# Patient Record
Sex: FEMALE | Race: WHITE | NOT HISPANIC OR LATINO | Employment: FULL TIME | URBAN - NONMETROPOLITAN AREA
[De-identification: names, ages, dates, MRNs, and addresses within clinical notes are randomized per-mention and may not be internally consistent; named-entity substitution may affect disease eponyms.]

---

## 2017-02-15 ENCOUNTER — HISTORY (OUTPATIENT)
Dept: FAMILY MEDICINE | Facility: OTHER | Age: 32
End: 2017-02-15

## 2017-02-15 ENCOUNTER — OFFICE VISIT - GICH (OUTPATIENT)
Dept: FAMILY MEDICINE | Facility: OTHER | Age: 32
End: 2017-02-15

## 2017-02-15 DIAGNOSIS — R68.84 JAW PAIN: ICD-10-CM

## 2017-05-08 ENCOUNTER — COMMUNICATION - GICH (OUTPATIENT)
Dept: FAMILY MEDICINE | Facility: OTHER | Age: 32
End: 2017-05-08

## 2017-05-08 DIAGNOSIS — F41.1 GENERALIZED ANXIETY DISORDER: ICD-10-CM

## 2017-05-09 ENCOUNTER — OFFICE VISIT - GICH (OUTPATIENT)
Dept: FAMILY MEDICINE | Facility: OTHER | Age: 32
End: 2017-05-09

## 2017-05-09 ENCOUNTER — HISTORY (OUTPATIENT)
Dept: FAMILY MEDICINE | Facility: OTHER | Age: 32
End: 2017-05-09

## 2017-05-09 DIAGNOSIS — N60.02 SOLITARY CYST OF LEFT BREAST: ICD-10-CM

## 2017-05-09 DIAGNOSIS — E66.9 OBESITY: ICD-10-CM

## 2017-05-09 DIAGNOSIS — M35.9 SYSTEMIC INVOLVEMENT OF CONNECTIVE TISSUE (H): ICD-10-CM

## 2017-05-09 DIAGNOSIS — Z00.00 ENCOUNTER FOR GENERAL ADULT MEDICAL EXAMINATION WITHOUT ABNORMAL FINDINGS: ICD-10-CM

## 2017-05-09 DIAGNOSIS — N60.01 SOLITARY CYST OF RIGHT BREAST: ICD-10-CM

## 2017-05-09 LAB
GLUCOSE SERPL-MCNC: 129 MG/DL (ref 70–105)
RUBELLA COMMENT - HISTORICAL: NORMAL

## 2017-05-09 ASSESSMENT — ANXIETY QUESTIONNAIRES
1. FEELING NERVOUS, ANXIOUS, OR ON EDGE: SEVERAL DAYS
6. BECOMING EASILY ANNOYED OR IRRITABLE: MORE THAN HALF THE DAYS
7. FEELING AFRAID AS IF SOMETHING AWFUL MIGHT HAPPEN: SEVERAL DAYS
3. WORRYING TOO MUCH ABOUT DIFFERENT THINGS: SEVERAL DAYS
5. BEING SO RESTLESS THAT IT IS HARD TO SIT STILL: NOT AT ALL
GAD7 TOTAL SCORE: 7
4. TROUBLE RELAXING: SEVERAL DAYS
2. NOT BEING ABLE TO STOP OR CONTROL WORRYING: SEVERAL DAYS

## 2017-05-09 ASSESSMENT — PATIENT HEALTH QUESTIONNAIRE - PHQ9: SUM OF ALL RESPONSES TO PHQ QUESTIONS 1-9: 4

## 2017-05-11 ENCOUNTER — COMMUNICATION - GICH (OUTPATIENT)
Dept: FAMILY MEDICINE | Facility: OTHER | Age: 32
End: 2017-05-11

## 2017-05-11 DIAGNOSIS — F41.1 GENERALIZED ANXIETY DISORDER: ICD-10-CM

## 2017-05-15 ENCOUNTER — COMMUNICATION - GICH (OUTPATIENT)
Dept: FAMILY MEDICINE | Facility: OTHER | Age: 32
End: 2017-05-15

## 2017-06-06 ENCOUNTER — AMBULATORY - GICH (OUTPATIENT)
Dept: FAMILY MEDICINE | Facility: OTHER | Age: 32
End: 2017-06-06

## 2017-06-06 ENCOUNTER — COMMUNICATION - GICH (OUTPATIENT)
Dept: FAMILY MEDICINE | Facility: OTHER | Age: 32
End: 2017-06-06

## 2017-06-06 DIAGNOSIS — N60.01 SOLITARY CYST OF RIGHT BREAST: ICD-10-CM

## 2017-06-06 DIAGNOSIS — M79.7 FIBROMYALGIA: ICD-10-CM

## 2017-06-30 ENCOUNTER — AMBULATORY - GICH (OUTPATIENT)
Dept: SCHEDULING | Facility: OTHER | Age: 32
End: 2017-06-30

## 2017-06-30 ENCOUNTER — TRANSFERRED RECORDS (OUTPATIENT)
Dept: HEALTH INFORMATION MANAGEMENT | Facility: OTHER | Age: 32
End: 2017-06-30

## 2017-07-07 ENCOUNTER — COMMUNICATION - GICH (OUTPATIENT)
Dept: FAMILY MEDICINE | Facility: OTHER | Age: 32
End: 2017-07-07

## 2017-07-07 DIAGNOSIS — F41.1 GENERALIZED ANXIETY DISORDER: ICD-10-CM

## 2017-07-12 ENCOUNTER — TRANSFERRED RECORDS (OUTPATIENT)
Dept: HEALTH INFORMATION MANAGEMENT | Facility: OTHER | Age: 32
End: 2017-07-12

## 2017-07-17 ENCOUNTER — AMBULATORY - GICH (OUTPATIENT)
Dept: SCHEDULING | Facility: OTHER | Age: 32
End: 2017-07-17

## 2017-08-21 ENCOUNTER — AMBULATORY - GICH (OUTPATIENT)
Dept: LAB | Facility: OTHER | Age: 32
End: 2017-08-21

## 2017-08-21 DIAGNOSIS — Z79.899 OTHER LONG TERM (CURRENT) DRUG THERAPY: ICD-10-CM

## 2017-08-21 LAB
A/G RATIO - HISTORICAL: 1.3 (ref 1–2)
ABSOLUTE BASOPHILS - HISTORICAL: 0 THOU/CU MM
ABSOLUTE EOSINOPHILS - HISTORICAL: 0.2 THOU/CU MM
ABSOLUTE IMMATURE GRANULOCYTES(METAS,MYELOS,PROS) - HISTORICAL: 0.1 THOU/CU MM
ABSOLUTE LYMPHOCYTES - HISTORICAL: 4.5 THOU/CU MM (ref 0.9–2.9)
ABSOLUTE MONOCYTES - HISTORICAL: 1.5 THOU/CU MM
ABSOLUTE NEUTROPHILS - HISTORICAL: 6.6 THOU/CU MM (ref 1.7–7)
ALBUMIN SERPL-MCNC: 3.9 G/DL (ref 3.5–5.7)
ALP SERPL-CCNC: 60 IU/L (ref 34–104)
ALT (SGPT) - HISTORICAL: 15 IU/L (ref 7–52)
ANION GAP - HISTORICAL: 12 (ref 5–18)
AST SERPL-CCNC: 12 IU/L (ref 13–39)
BASOPHILS # BLD AUTO: 0.2 %
BILIRUB SERPL-MCNC: 0.3 MG/DL (ref 0.3–1)
BUN SERPL-MCNC: 15 MG/DL (ref 7–25)
BUN/CREAT RATIO - HISTORICAL: 20
C-REACTIVE PROTEIN - HISTORICAL: <1 MG/DL
CALCIUM SERPL-MCNC: 9.2 MG/DL (ref 8.6–10.3)
CHLORIDE SERPLBLD-SCNC: 104 MMOL/L (ref 98–107)
CK SERPL-CCNC: 32 IU/L (ref 30–223)
CO2 SERPL-SCNC: 22 MMOL/L (ref 21–31)
CREAT SERPL-MCNC: 0.76 MG/DL (ref 0.7–1.3)
EOSINOPHIL NFR BLD AUTO: 1.8 %
ERYTHROCYTE [DISTWIDTH] IN BLOOD BY AUTOMATED COUNT: 13.2 % (ref 11.5–15.5)
ERYTHROCYTE [SEDIMENTATION RATE] IN BLOOD: 23 MM/HR
GFR IF NOT AFRICAN AMERICAN - HISTORICAL: >60 ML/MIN/1.73M2
GLOBULIN - HISTORICAL: 2.9 G/DL (ref 2–3.7)
GLUCOSE SERPL-MCNC: 84 MG/DL (ref 70–105)
HCT VFR BLD AUTO: 36.5 % (ref 33–51)
HEMOGLOBIN: 11.9 G/DL (ref 12–16)
IMMATURE GRANULOCYTES(METAS,MYELOS,PROS) - HISTORICAL: 0.5 %
LYMPHOCYTES NFR BLD AUTO: 34.6 % (ref 20–44)
MCH RBC QN AUTO: 28.7 PG (ref 26–34)
MCHC RBC AUTO-ENTMCNC: 32.6 G/DL (ref 32–36)
MCV RBC AUTO: 88 FL (ref 80–100)
MONOCYTES NFR BLD AUTO: 11.9 %
NEUTROPHILS NFR BLD AUTO: 51 % (ref 42–72)
PLATELET # BLD AUTO: 434 THOU/CU MM (ref 140–440)
PMV BLD: 9.6 FL (ref 6.5–11)
POTASSIUM SERPL-SCNC: 3.6 MMOL/L (ref 3.5–5.1)
PROT SERPL-MCNC: 6.8 G/DL (ref 6.4–8.9)
RED BLOOD COUNT - HISTORICAL: 4.14 MIL/CU MM (ref 4–5.2)
SODIUM SERPL-SCNC: 138 MMOL/L (ref 133–143)
WHITE BLOOD COUNT - HISTORICAL: 13 THOU/CU MM (ref 4.5–11)

## 2017-08-22 LAB — Lab: 4 U/L

## 2017-10-03 ENCOUNTER — AMBULATORY - GICH (OUTPATIENT)
Dept: LAB | Facility: OTHER | Age: 32
End: 2017-10-03

## 2017-10-03 DIAGNOSIS — Z79.899 OTHER LONG TERM (CURRENT) DRUG THERAPY: ICD-10-CM

## 2017-10-03 LAB
A/G RATIO - HISTORICAL: 1.6 (ref 1–2)
ABSOLUTE BASOPHILS - HISTORICAL: 0 THOU/CU MM
ABSOLUTE EOSINOPHILS - HISTORICAL: 0.2 THOU/CU MM
ABSOLUTE IMMATURE GRANULOCYTES(METAS,MYELOS,PROS) - HISTORICAL: 0.1 THOU/CU MM
ABSOLUTE LYMPHOCYTES - HISTORICAL: 4.9 THOU/CU MM (ref 0.9–2.9)
ABSOLUTE MONOCYTES - HISTORICAL: 1.5 THOU/CU MM
ABSOLUTE NEUTROPHILS - HISTORICAL: 8.2 THOU/CU MM (ref 1.7–7)
ALBUMIN SERPL-MCNC: 4.3 G/DL (ref 3.5–5.7)
ALP SERPL-CCNC: 57 IU/L (ref 34–104)
ALT (SGPT) - HISTORICAL: 13 IU/L (ref 7–52)
ANION GAP - HISTORICAL: 11 (ref 5–18)
AST SERPL-CCNC: 12 IU/L (ref 13–39)
BASOPHILS # BLD AUTO: 0.3 %
BILIRUB SERPL-MCNC: 0.3 MG/DL (ref 0.3–1)
BUN SERPL-MCNC: 14 MG/DL (ref 7–25)
BUN/CREAT RATIO - HISTORICAL: 18
C-REACTIVE PROTEIN - HISTORICAL: <1 MG/DL
CALCIUM SERPL-MCNC: 9.6 MG/DL (ref 8.6–10.3)
CHLORIDE SERPLBLD-SCNC: 105 MMOL/L (ref 98–107)
CK SERPL-CCNC: 47 IU/L (ref 30–223)
CO2 SERPL-SCNC: 22 MMOL/L (ref 21–31)
CREAT SERPL-MCNC: 0.8 MG/DL (ref 0.7–1.3)
EOSINOPHIL NFR BLD AUTO: 1.5 %
ERYTHROCYTE [DISTWIDTH] IN BLOOD BY AUTOMATED COUNT: 13.6 % (ref 11.5–15.5)
ERYTHROCYTE [SEDIMENTATION RATE] IN BLOOD: 17 MM/HR
GFR IF NOT AFRICAN AMERICAN - HISTORICAL: >60 ML/MIN/1.73M2
GLOBULIN - HISTORICAL: 2.7 G/DL (ref 2–3.7)
GLUCOSE SERPL-MCNC: 91 MG/DL (ref 70–105)
HCT VFR BLD AUTO: 37.3 % (ref 33–51)
HEMOGLOBIN: 11.9 G/DL (ref 12–16)
IMMATURE GRANULOCYTES(METAS,MYELOS,PROS) - HISTORICAL: 0.5 %
LYMPHOCYTES NFR BLD AUTO: 33 % (ref 20–44)
MCH RBC QN AUTO: 27.9 PG (ref 26–34)
MCHC RBC AUTO-ENTMCNC: 31.9 G/DL (ref 32–36)
MCV RBC AUTO: 87 FL (ref 80–100)
MONOCYTES NFR BLD AUTO: 10 %
NEUTROPHILS NFR BLD AUTO: 54.7 % (ref 42–72)
PLATELET # BLD AUTO: 473 THOU/CU MM (ref 140–440)
PMV BLD: 10 FL (ref 6.5–11)
POTASSIUM SERPL-SCNC: 3.6 MMOL/L (ref 3.5–5.1)
PROT SERPL-MCNC: 7 G/DL (ref 6.4–8.9)
RED BLOOD COUNT - HISTORICAL: 4.27 MIL/CU MM (ref 4–5.2)
SODIUM SERPL-SCNC: 138 MMOL/L (ref 133–143)
WHITE BLOOD COUNT - HISTORICAL: 15 THOU/CU MM (ref 4.5–11)

## 2017-10-04 LAB — Lab: 4 U/L

## 2017-10-16 ENCOUNTER — TRANSFERRED RECORDS (OUTPATIENT)
Dept: HEALTH INFORMATION MANAGEMENT | Facility: OTHER | Age: 32
End: 2017-10-16

## 2017-10-26 ENCOUNTER — TRANSFERRED RECORDS (OUTPATIENT)
Dept: HEALTH INFORMATION MANAGEMENT | Facility: OTHER | Age: 32
End: 2017-10-26

## 2017-11-14 ENCOUNTER — AMBULATORY - GICH (OUTPATIENT)
Dept: LAB | Facility: OTHER | Age: 32
End: 2017-11-14

## 2017-11-14 DIAGNOSIS — Z79.899 OTHER LONG TERM (CURRENT) DRUG THERAPY: ICD-10-CM

## 2017-11-14 DIAGNOSIS — M33.20 POLYMYOSITIS (H): ICD-10-CM

## 2017-11-14 LAB
A/G RATIO - HISTORICAL: 1.4 (ref 1–2)
ABSOLUTE BASOPHILS - HISTORICAL: 0.1 THOU/CU MM
ABSOLUTE EOSINOPHILS - HISTORICAL: 0.3 THOU/CU MM
ABSOLUTE IMMATURE GRANULOCYTES(METAS,MYELOS,PROS) - HISTORICAL: 0.1 THOU/CU MM
ABSOLUTE LYMPHOCYTES - HISTORICAL: 1.9 THOU/CU MM (ref 0.9–2.9)
ABSOLUTE MONOCYTES - HISTORICAL: 1.2 THOU/CU MM
ABSOLUTE NEUTROPHILS - HISTORICAL: 8.5 THOU/CU MM (ref 1.7–7)
ALBUMIN SERPL-MCNC: 4.2 G/DL (ref 3.5–5.7)
ALP SERPL-CCNC: 68 IU/L (ref 34–104)
ALT (SGPT) - HISTORICAL: 15 IU/L (ref 7–52)
ANION GAP - HISTORICAL: 8 (ref 5–18)
AST SERPL-CCNC: 15 IU/L (ref 13–39)
BASOPHILS # BLD AUTO: 0.4 %
BILIRUB SERPL-MCNC: 0.3 MG/DL (ref 0.3–1)
BUN SERPL-MCNC: 13 MG/DL (ref 7–25)
BUN/CREAT RATIO - HISTORICAL: 19
C-REACTIVE PROTEIN - HISTORICAL: 1.82 MG/DL
CALCIUM SERPL-MCNC: 10 MG/DL (ref 8.6–10.3)
CHLORIDE SERPLBLD-SCNC: 104 MMOL/L (ref 98–107)
CK SERPL-CCNC: 48 IU/L (ref 30–223)
CO2 SERPL-SCNC: 24 MMOL/L (ref 21–31)
CREAT SERPL-MCNC: 0.7 MG/DL (ref 0.7–1.3)
EOSINOPHIL NFR BLD AUTO: 2.7 %
ERYTHROCYTE [DISTWIDTH] IN BLOOD BY AUTOMATED COUNT: 14 % (ref 11.5–15.5)
ERYTHROCYTE [SEDIMENTATION RATE] IN BLOOD: 31 MM/HR
GFR IF NOT AFRICAN AMERICAN - HISTORICAL: >60 ML/MIN/1.73M2
GLOBULIN - HISTORICAL: 3.1 G/DL (ref 2–3.7)
GLUCOSE SERPL-MCNC: 92 MG/DL (ref 70–105)
HCT VFR BLD AUTO: 37.5 % (ref 33–51)
HEMOGLOBIN: 11.9 G/DL (ref 12–16)
IMMATURE GRANULOCYTES(METAS,MYELOS,PROS) - HISTORICAL: 0.6 %
LYMPHOCYTES NFR BLD AUTO: 15.6 % (ref 20–44)
MCH RBC QN AUTO: 27.4 PG (ref 26–34)
MCHC RBC AUTO-ENTMCNC: 31.7 G/DL (ref 32–36)
MCV RBC AUTO: 86 FL (ref 80–100)
MONOCYTES NFR BLD AUTO: 9.6 %
NEUTROPHILS NFR BLD AUTO: 71.1 % (ref 42–72)
PLATELET # BLD AUTO: 475 THOU/CU MM (ref 140–440)
PMV BLD: 9.7 FL (ref 6.5–11)
POTASSIUM SERPL-SCNC: 4.2 MMOL/L (ref 3.5–5.1)
PROT SERPL-MCNC: 7.3 G/DL (ref 6.4–8.9)
RED BLOOD COUNT - HISTORICAL: 4.34 MIL/CU MM (ref 4–5.2)
SODIUM SERPL-SCNC: 136 MMOL/L (ref 133–143)
WHITE BLOOD COUNT - HISTORICAL: 12 THOU/CU MM (ref 4.5–11)

## 2017-11-15 LAB — Lab: 6 U/L

## 2017-12-04 ENCOUNTER — AMBULATORY - GICH (OUTPATIENT)
Dept: SCHEDULING | Facility: OTHER | Age: 32
End: 2017-12-04

## 2017-12-28 NOTE — TELEPHONE ENCOUNTER
Patient Information     Patient Name MRN Maria G Hassan [id number] Female 1985      Telephone Encounter by Naa Hurtado at 7/10/2017 12:00 PM     Author:  Naa Hurtado Service:  (none) Author Type:  NURS- Student Nurse     Filed:  7/10/2017 12:03 PM Encounter Date:  2017 Status:  Signed     :  Naa Hurtado (NURS- Student Nurse)            Refill refused, last filled 17 #90 r-3.     Unable to complete prescription refill per RN Medication Refill Policy.................... Naa Hurtado RN ....................  7/10/2017   12:01 PM

## 2018-01-03 NOTE — PROGRESS NOTES
Patient Information     Patient Name MRN Sex Maria G Styles [id number] Female 1985      Progress Notes by Anyi Sahu NP at 2/15/2017  6:15 PM     Author:  Anyi Sahu NP Service:  (none) Author Type:  PHYS- Nurse Practitioner     Filed:  2017  2:18 PM Encounter Date:  2/15/2017 Status:  Signed     :  Anyi Sahu NP (PHYS- Nurse Practitioner)            HPI:    Maria G Haley is a 31 y.o. female who presents to clinic today for neck/ear/jaw pain.   Right sided jaw and neck pain for the past 6 days.  Today with right sided ear pain.  Chronic sore throat.  Headache today - feels like a muscle tension.  Denies fevers.  no difficulty swallowing.  Painful to chew.  Mild popping sensation in right ear.  No ringing in ears.  Not taking any muscle relaxers.  Trying not to take any Ibuprofen or Tylenol.  Has not tried any ice or heat.  States she is being worked up for polymyositis - states she is seeing a specialist next month.    She has been going to [facility].            Past Medical History      Diagnosis   Date     Gastroesophageal reflux disease       Kidney stone   2008     R kidney stone ct      Knee pain, bilateral       Learning disability       /depression ?ADHD      Mild recurrent major depression (HC)       NICOTINE ADDICTION  2011     Using nicoderm patch        Tetanus-diphtheria (Td) vaccination       Past Surgical History       Procedure   Laterality Date     Tympanostomy   age 5     PC tubes        Tonsil and adenoidectomy   age 5     Social History       Substance Use Topics         Smoking status:   Former Smoker     Packs/day:  0.50     Types:  Cigarettes     Quit date:  2013     Smokeless tobacco:   Never Used      Comment: information provided on NRT      Alcohol use   No     Current Outpatient Prescriptions       Medication  Sig Dispense Refill     ALPRAZolam (XANAX) 0.25 mg tablet Take 1 tablet by mouth 3 times daily if  "needed. 15 tablet 0     citalopram (CELEXA) 10 mg tablet TAKE ONE TABLET BY MOUTH EVERY DAY 90 tablet 1     cyclobenzaprine (FLEXERIL) 10 mg tablet Take 10 mg by mouth.       ibuprofen (ADVIL; MOTRIN) 800 mg tablet Take 800 mg by mouth every 8 hours if needed.       triamcinolone (ARISTOCORT; KENALOG) 0.1 % cream Apply  topically to affected area(s) 3 times daily. 1 Tube 0     No current facility-administered medications for this visit.      Medications have been reviewed by me and are current to the best of my knowledge and ability.    Allergies      Allergen   Reactions     Macrobid [Nitrofurantoin]  *Unknown     Vicodin [Hydrocodone-Acetaminophen]  Nausea Only     Zoloft [Sertraline]  Anxiety     Increased patient's anxiety        ROS:  Refer to HPI    Visit Vitals       /80     Pulse 88     Temp 98.1  F (36.7  C) (Tympanic)     Ht 1.535 m (5' 0.43\")     Wt 79.6 kg (175 lb 6.4 oz)     Breastfeeding No     BMI 33.77 kg/m2       EXAM:  General Appearance: Well appearing adult female, appropriate appearance for age. No acute distress  Head: normocephalic, atraumatic  Ears: Left TM with bony landmarks appreciated, no erythema, no effusion, no bulging, no purulence.  Right TM with bony landmarks appreciated, no erythema, no effusion, no bulging, no purulence.   Left auditory canal clear.  Right auditory canal clear.  Normal external ears, non tender.  Eyes: conjunctivae normal, no drainage  Orophayrnx: moist mucous membranes, posterior pharynx without erythema, tonsils without hypertrophy, no erythema, no exudates or petechiae, no post nasal drip seen.  No trismus.  Dentition intact, no surrounding erythema or swelling, non tender to palpation.  No sinus pain upon palpation of the frontal, maxillary, or ethmoid sinuses  Neck: supple without adenopathy, no submandibular or parotid swelling or tenderness to palpation   Respiratory: normal chest wall and respirations.  Normal effort.  Clear to auscultation " bilaterally, no wheezes or rhonchi or congestion, no cough appreciated  Cardiac: RRR with no murmurs  Musculoskeletal:  Tenderness to palpation over right TMJ with mild clicking palpable with jaw movement.  No cervical spine tenderness to palpation.  Normal ROM of neck without difficulty.   Dermatological: no rashes or lesions  Psychological: normal affect, alert and pleasant    ASSESSMENT/PLAN:    ICD-10-CM    1. Jaw pain R68.84     right side over TMJ           Likely TMJ disorder  Ibuprofen TID PRN  ICE and Heat PRN  Gentle massage over TMJ  Follow up with PCP if symptoms persist or worsen or concerns        Patient Instructions   Recommend follow up with primary provider or clinic provider for further evaluation    Ibuprofen    ICE and Heat         Index Urdu   Temporomandibular Joint (TMJ) Disorder   ________________________________________________________________________  KEY POINTS    Temporomandibular joint (TMJ) disorder is a condition that causes pain in your jaw.    It may help to eat soft food and learn ways to relax the muscles in your jaw.    TMJ can be treated with moist heat, massage, a mouth splint or guard, and sometimes medicine that can relax the muscles of the jaw.  ________________________________________________________________________  What is temporomandibular joint disorder?  Temporomandibular joint disorder (TMJ disorder) is pain in the joint where your jaw meets your skull, just in front of your ears. TMJ disorder is more common in women than men.  What is the cause?  The cause of TMJ disorder is not known. Possible causes include:    Clenching your jaw or grinding your teeth. You may clench your jaws or grind your teeth when you are feeling stressed or when you are sleeping. If you do it mainly when you are sleeping, you may not even know you are doing it.    Dentures that do not fit properly    Frequent chewing of gum or ice    Physical or dental problems, such as teeth that don t  fit together properly or problems with the shape of your jaw    Swelling and irritation caused by arthritis or injury    Missing teeth that have not been replaced  What are the symptoms?  The most common symptom is pain in your jaw joint, usually on one side only. The pain is usually dull but sometimes it may be sharp. In most cases, the pain is worse when you move your jaw, especially when you are chewing. If you grind your teeth at night, the pain may be worse when you wake up in the morning.  Other symptoms may include:    Clicking, popping, or grating sounds when you move your jaw    Trouble completely opening your jaw or pain when you bite down    Headache    Ear pain or earache  The symptoms of TMJ disorder are similar to the symptoms of other conditions, such as ear problems. You should see your healthcare provider to find out what is causing your symptoms.  How is it diagnosed?  Your healthcare provider will ask about your symptoms, activities, and medical history and examine you. You may have an X-ray.  How is it treated?  If there is a problem with the way your teeth fit together when you bite, your healthcare provider may refer you to a dentist. Your dentist may make a hard splint for you to wear during the day to keep your jaw from closing completely.  Your healthcare provider may give you a shot of steroid medicine to lessen any swelling and irritation caused by arthritis or an injury.  Other treatments may include:    Taking muscle relaxants for a few days    Practicing relaxation techniques    Learning ways to manage stress  Your healthcare provider may refer you to a physical therapist for treatment, such as massage and exercises that gently stretch the muscles and help with relaxation. If your pain is related to stress, counseling and medicine can help.  Surgery is rarely needed.  How can I take care of myself?  To help relieve your symptoms:    Avoid overusing your jaw. You can rest your jaw by  eating only soft food. Do not chew gum or ice.    Try not to clench your jaw or grind your teeth. Your healthcare provider may recommend a bite block (also called a ). A bite block is a plastic mouthpiece that stops your teeth from grinding together. It s usually worn only at night.    Put a warm, moist washcloth on your jaw for 20 minutes, 4 to 8 times a day. Do not use a dry heating pad.    Massage the joint by pressing gently with your fingertips and moving them in a circular direction.    Ask your healthcare provider about taking a nonprescription pain medicine.  Follow the full course of treatment prescribed by your provider. Ask your provider:    How and when you will get your test results    How long it will take to recover    If there are activities you should avoid and when you can return to your normal activities    How to take care of yourself at home    What symptoms or problems you should watch for and what to do if you have them  Make sure you know when you should come back for a checkup. Keep all appointments for provider visits or tests.  How can I help prevent TMJ disorder?   Because the cause of TMJ disorder is often not known, it can be hard to prevent. The following tips may help:    Don t chew a lot of gum or ice.    Try not to grind your teeth.    See your dentist for treatment if your teeth do not fit together well when you chew.    Replace any missing teeth.    Wear proper protective head gear that fits well in all contact sports.  You can get more information from:    American Dental Association  139.854.2234   http://www.mouthhealthy.org/en/  Developed by [facility].  Adult Advisor 2016.3 published by [employer].  Last modified: 2016-03-23  Last reviewed: 2016-02-12  This content is reviewed periodically and is subject to change as new health information becomes available. The information is intended to inform and educate and is not a replacement for medical evaluation, advice,  diagnosis or treatment by a healthcare professional.  References   Adult Advisor 2016.3 Index    Copyright   2016 [employer], a division of [employer] All rights reserved.

## 2018-01-03 NOTE — NURSING NOTE
Patient Information     Patient Name MRN Sex Maria G Styles [id number] Female 1985      Nursing Note by Niru Frederick at 2/15/2017  6:15 PM     Author:  Niru Frederick Service:  (none) Author Type:  NURS- Student Practical Nurse     Filed:  2/15/2017  6:19 PM Encounter Date:  2/15/2017 Status:  Signed     :  Niru Frederick (NURS- Student Practical Nurse)            For past few days patient has had jaw pain right, now it is into ear and neck. Patient states starting today it's in both. Niru Frederick LPN .............2/15/2017  6:11 PM

## 2018-01-03 NOTE — PATIENT INSTRUCTIONS
Patient Information     Patient Name MRN Maria G Hassan [id number] Female 1985      Patient Instructions by Anyi Sahu NP at 2/15/2017  6:29 PM     Author:  Anyi Sahu NP  Service:  (none) Author Type:  PHYS- Nurse Practitioner     Filed:  2/15/2017  6:29 PM  Encounter Date:  2/15/2017 Status:  Addendum     :  Anyi Sahu NP (PHYS- Nurse Practitioner)        Related Notes: Original Note by Anyi Sahu NP (PHYS- Nurse Practitioner) filed at 2/15/2017  6:29 PM            Recommend follow up with primary provider or clinic provider for further evaluation    Ibuprofen    ICE and Heat         Index [place] citizen   Temporomandibular Joint (TMJ) Disorder   ________________________________________________________________________  KEY POINTS    Temporomandibular joint (TMJ) disorder is a condition that causes pain in your jaw.    It may help to eat soft food and learn ways to relax the muscles in your jaw.    TMJ can be treated with moist heat, massage, a mouth splint or guard, and sometimes medicine that can relax the muscles of the jaw.  ________________________________________________________________________  What is temporomandibular joint disorder?  Temporomandibular joint disorder (TMJ disorder) is pain in the joint where your jaw meets your skull, just in front of your ears. TMJ disorder is more common in women than men.  What is the cause?  The cause of TMJ disorder is not known. Possible causes include:    Clenching your jaw or grinding your teeth. You may clench your jaws or grind your teeth when you are feeling stressed or when you are sleeping. If you do it mainly when you are sleeping, you may not even know you are doing it.    Dentures that do not fit properly    Frequent chewing of gum or ice    Physical or dental problems, such as teeth that don t fit together properly or problems with the shape of your jaw    Swelling and irritation caused by arthritis or  injury    Missing teeth that have not been replaced  What are the symptoms?  The most common symptom is pain in your jaw joint, usually on one side only. The pain is usually dull but sometimes it may be sharp. In most cases, the pain is worse when you move your jaw, especially when you are chewing. If you grind your teeth at night, the pain may be worse when you wake up in the morning.  Other symptoms may include:    Clicking, popping, or grating sounds when you move your jaw    Trouble completely opening your jaw or pain when you bite down    Headache    Ear pain or earache  The symptoms of TMJ disorder are similar to the symptoms of other conditions, such as ear problems. You should see your healthcare provider to find out what is causing your symptoms.  How is it diagnosed?  Your healthcare provider will ask about your symptoms, activities, and medical history and examine you. You may have an X-ray.  How is it treated?  If there is a problem with the way your teeth fit together when you bite, your healthcare provider may refer you to a dentist. Your dentist may make a hard splint for you to wear during the day to keep your jaw from closing completely.  Your healthcare provider may give you a shot of steroid medicine to lessen any swelling and irritation caused by arthritis or an injury.  Other treatments may include:    Taking muscle relaxants for a few days    Practicing relaxation techniques    Learning ways to manage stress  Your healthcare provider may refer you to a physical therapist for treatment, such as massage and exercises that gently stretch the muscles and help with relaxation. If your pain is related to stress, counseling and medicine can help.  Surgery is rarely needed.  How can I take care of myself?  To help relieve your symptoms:    Avoid overusing your jaw. You can rest your jaw by eating only soft food. Do not chew gum or ice.    Try not to clench your jaw or grind your teeth. Your healthcare  provider may recommend a bite block (also called a ). A bite block is a plastic mouthpiece that stops your teeth from grinding together. It s usually worn only at night.    Put a warm, moist washcloth on your jaw for 20 minutes, 4 to 8 times a day. Do not use a dry heating pad.    Massage the joint by pressing gently with your fingertips and moving them in a circular direction.    Ask your healthcare provider about taking a nonprescription pain medicine.  Follow the full course of treatment prescribed by your provider. Ask your provider:    How and when you will get your test results    How long it will take to recover    If there are activities you should avoid and when you can return to your normal activities    How to take care of yourself at home    What symptoms or problems you should watch for and what to do if you have them  Make sure you know when you should come back for a checkup. Keep all appointments for provider visits or tests.  How can I help prevent TMJ disorder?   Because the cause of TMJ disorder is often not known, it can be hard to prevent. The following tips may help:    Don t chew a lot of gum or ice.    Try not to grind your teeth.    See your dentist for treatment if your teeth do not fit together well when you chew.    Replace any missing teeth.    Wear proper protective head gear that fits well in all contact sports.  You can get more information from:    American Dental Association  935.274.3260   http://www.mouthhealthy.org/en/  Developed by [facility].  Adult Advisor 2016.3 published by [employer].  Last modified: 2016-03-23  Last reviewed: 2016-02-12  This content is reviewed periodically and is subject to change as new health information becomes available. The information is intended to inform and educate and is not a replacement for medical evaluation, advice, diagnosis or treatment by a healthcare professional.  References   Adult Advisor 2016.3 Index    Copyright   2016  [employer], a division of [employer] All rights reserved.

## 2018-01-04 NOTE — NURSING NOTE
Patient Information     Patient Name MRN Maria G Hassan [id number] Female 1985      Nursing Note by Mi Ireland at 2017  3:00 PM     Author:  Mi Ireland Service:  (none) Author Type:  (none)     Filed:  2017  3:38 PM Encounter Date:  2017 Status:  Signed     :  Mi Ireland            Patient presents to the clinic for physical.  Mi Ireland LPN........................2017  3:00 PM

## 2018-01-04 NOTE — PATIENT INSTRUCTIONS
"Patient Information     Patient Name MRN Sex Maria G Styles [id number] Female 1985      Patient Instructions by Neelam Schwartz MD at 2017  3:00 PM     Author:  Neelam Schwartz MD Service:  (none) Author Type:  Physician     Filed:  2017  4:14 PM Encounter Date:  2017 Status:  Signed     :  Neelam Schwartz MD (Physician)            Car Safety tips:   Wear your seatbelt at all times.   Do not drive when tired.  If drinking alcohol, find a designated .  Do NOT EVER text and drive!   Consider DRIVE MODE mehnaz for your phone.     Referral to rheumatology provided  Weight Loss Strategies  1. Eat at least 3 meals a day and never skip breakfast.  2. Eat more slowly.  3. Decrease portion size.  4. Provide structure by using meal replacement bars or shakes, and/or low calorie frozen meals.  5. For good nutrition incorporate fruit, vegetables, whole grains, lean protein, and low-fat dairy.  6. Remove trigger foods from your environment to avoid impulse eating.  7. Increase physical activity: get a pedometer and aim for 10,000 steps a day or 30-35 minutes of activity 5 days per week.  8. Weigh yourself daily or at least weekly.  9. Keep a record of what you eat and your activity.  10. Establish a support system such as a friend, group or program.    11.  Read Adonis Walker's \"Eat to Live\"   12.   Remember it is important to have a minimum of 1400 calories a day, okay to use olive oil, 40 grams of fiber daily.  No more than two servings ( the size of your palm) of red meat a week.       Labs will be mailed to your home.   Work on following  a mediterranean diet; Use monosaturated fats ( olive , canola and peanut   oil; nuts, avocados), abundance of plant foods which are minimally processed, fish (salmon).  Exercise 30 minutes every day  [facility] staff will call you with your bilateral breast ultrasound appointment.   Try to get 7-8 hours sleep each night.  Rest is " important!      Recommendations for females ages 18-40 years old:    Maintaining a few health-related habits can have a huge impact on your future health. Please consider the following general health recommendations:     Eat a quality diet (generally, low in simple sugars, starches, cholesterol and saturated fat.)    If your diet contains less than 4 servings of dairy products each day, take a Calcium 600mg/Vit D 200IU tablet twice daily to help maintain your bone strength.    If you are considering pregnancy begin a supplemental vitamin with 1 mg of folic acid daily.  Avoid alcohol when you think you might have conceived, as disability related to the fetal alcohol syndrome can occur with as few as 1 to 2 drinks if consumed at a critical time in your baby's development.    Never smoke. Avoid chewing tobacco.    Limit alcohol to no more than 1-2 in 24 hours, as higher use increases your weight, can damage your liver or pancreas, and increases triglycerides (fat in the blood). Never drink and drive.    Avoid the use of recreational drugs.  Methamphetamine, for example, often causes addiction with the first use.    Exercise regularly.  Ideally you would have 30-60 minutes of aerobic exercise at least 4 times weekly.  Find something you enjoy and a friend to do it with you.    Maintain ideal weight.  Body mass index is 33.54 kg/(m^2).  Generally a BMI of 20-25 is considered ideal. Overweight is defined as 25-30, Obese is 30-35 and markedly obese is greater than 35.      Apply sun block (SPF 25 or greater) on exposed skin anytime you are out in the sun to prevent skin cancer.      Wear a seatbelt whenever you are in a car.    You should have a tetanus booster at least every 10 years.  Consider a flu shot every fall.    Immunization History     Administered  Date(s) Administered     Tdap, Unspecified 11/29/2007       Your cholesterol should be checked annually if high, and once every 5 years if normal.    You should have  a pap  every 2 years between ages 21-30 and every 3 years between the ages of 30 and 70 unless you have had previous abnormal pap smear, (in these cases the exams and PAP's should be done yearly). If you have had hysterectomy in the past, your future Pap plan may be different.     Consider testing for sexually transmitted disease (STD) if you have had more than 2 partners in the last 5 years or have any other reason to believe you are at risk of infection.  The best way to minimize your risk of STD is to limit the number of partners you have and know their sexual history. Using condoms decreases your risk, but does not always prevent STD.

## 2018-01-04 NOTE — TELEPHONE ENCOUNTER
Patient Information     Patient Name MRN Maria G Hassan [id number] Female 1985      Telephone Encounter by Ann Marie Turner RN at 2017 10:50 AM     Author:  Ann Marie Turner RN Service:  (none) Author Type:  (none)     Filed:  2017 10:53 AM Encounter Date:  2017 Status:  Signed     :  Ann Marie Turner RN (NURS- Registered Nurse)            Depression-in adults 18 and over  SSRI    Office visit in the past 12 months or as indicated in chart.  Should have clinic visit 1-2 months after initial prescription.    Last visit with FERNY HAWKINS was on: 2016 in [facility]  Next visit with FERNY HAWKINS is on: 2017 in [facility]  Next visit with Family Practice is on: 2017 in [facility]    Max refills 12 months from last office visit or per providers notes.      PHQ Depression Screening 11/10/2015 2016   Date of PHQ exam (doc flow) 11/10/2015 2016   1. Lack of interest/pleasure 0 - Not at all 0 - Not at all   2. Feeling down/depressed 0 - Not at all 0 - Not at all   PHQ-2 TOTAL SCORE 0 0   3. Trouble sleeping 1 - Several days 1 - Several days   4. Decreased energy 1 - Several days 2 - More than half the days   5. Appetite change 1 - Several days 2 - More than half the days   6. Feelings of failure 1 - Several days 0 - Not at all   7. Trouble concentrating 0 - Not at all 1 - Several days   8. Activity level 0 - Not at all 0 - Not at all   9. Hurting yourself 0 - Not at all 0 - Not at all   PHQ-9 TOTAL SCORE 4 6   PHQ-9 Severity Level none mild   Functional Impairment somewhat difficult somewhat difficult     Patient has appointment tomorrow. Will address medication at that visit.    Unable to complete prescription refill per RN Medication Refill Policy.................... Ann Marie Turner ....................  2017   10:53 AM

## 2018-01-05 NOTE — TELEPHONE ENCOUNTER
Patient Information     Patient Name MRN Maria G Hassan [id number] Female 1985      Telephone Encounter by Sue Joe at 2017  3:40 PM     Author:  Sue Joe Service:  (none) Author Type:  (none)     Filed:  2017  3:40 PM Encounter Date:  5/15/2017 Status:  Signed     :  Sue Joe            Patient notified  Sue Joe LPN     2017 3:40 PM

## 2018-01-05 NOTE — TELEPHONE ENCOUNTER
Patient Information     Patient Name MRN Sex Maria G Styles [id number] Female 1985      Telephone Encounter by Neelam Schwartz MD at 2017  3:28 PM     Author:  Neelam Schwartz MD Service:  (none) Author Type:  Physician     Filed:  2017  3:29 PM Encounter Date:  5/15/2017 Status:  Signed     :  Neelam Schwartz MD (Physician)            It is what was recommended by the radiologist as we discussed at the time of her office visit.

## 2018-01-05 NOTE — TELEPHONE ENCOUNTER
Patient Information     Patient Name MRN Maria G Hassan [id number] Female 1985      Telephone Encounter by Sue Joe at 2017  2:02 PM     Author:  Sue Joe Service:  (none) Author Type:  (none)     Filed:  2017  2:07 PM Encounter Date:  5/15/2017 Status:  Signed     :  Sue Joe            Patient is wondering why US was done verses a mammogram. Sue Joe LPN .......................2017  2:07 PM

## 2018-01-05 NOTE — PROGRESS NOTES
Patient Information     Patient Name MRN Sex     Maria G Haley [id number] Female 1985      Progress Notes by Neelam Schwartz MD at 2017  3:00 PM     Author:  Neelam Schwartz MD Service:  (none) Author Type:  Physician     Filed:  5/10/2017  6:30 PM Encounter Date:  2017 Status:  Signed     :  Neelam Schwartz MD (Physician)            Nursing Notes:   Mi Ireland  2017  3:38 PM  Signed  Patient presents to the clinic for physical.  Mi Ireland LPN........................2017  3:00 PM        SUBJECTIVE:    Maria G Haley is a 32 y.o. female who presents for annual physical examination.     HPI: Patient is a  2 Para2 Here for annual GYN examination Patient's last menstrual period was 2017..  Periods are normal.    She is in a monogamous relationship.  Declines sexually transmitted disease testing.  No vaginal complaints.  No breast complaints.   No family history of breast or ovarian cancer.       RIGHT BREAST ULTRASOUND, 2014     CLINICAL: Abnormal breast finding.     FINDINGS: Scanning in the RIGHT breast upper outer quadrant demonstrates a septated anechoic cyst that does have posterior acoustic enhancement and measures 5 mm. Scanning at the 11 to 12 o'clock position of the RIGHT breast demonstrates two minimally lobulated medium echogenicity structures that are well defined and do have areas of marginal increased echogenicity that are localized. The 11 o'clock abnormality measures approximately 5 x 10 mm and the 12 o'clock abnormality approximately 7 x 13 mm. On color Doppler there is blood flow adjacent to these structures that extend at one point into the margin of the abnormalities.     IMPRESSION: Comparison is made to the prior exam completed 2/3/2014. The medium echogenicity lobulated structures are unchanged in size and appearance. The findings continue to be best explained by intramammary lymph nodes. A followup RIGHT  "breast ultrasound in 6 months is recommended to document continued stability.     Category 3: Probably benign finding. Short term followup is recommended.           Armaan Majano M.D.  [facility].    History of depression   On celexa.  Doing well.  Feels 'much much better on the prednisone\".  \" It's like a whole new life\".      PHQ Depression Screen  Date of PHQ exam: 17  Over the last 2 weeks, how often have you been bothered by any of the following problems?  1. Little interest or pleasure in doing things: 0 - Not at all  2. Feeling down, depressed, or hopeless: 0 - Not at all  3. Trouble falling or staying asleep, or sleeping too much: 2 - More than half the days  4. Feeling tired or having little energy: 0 - Not at all  5. Poor appetite or overeatin - More than half the days  6. Feeling bad about yourself - or that you are a failure or have let yourself or your family down: 0 - Not at all  7. Trouble concentrating on things, such as reading the newspaper or watching television: 0 - Not at all  8. Moving or speaking so slowly that other people could have noticed. Or the opposite - being so fidgety or restless that you have been moving around a lot more than usual: 0 - Not at all  9. Thoughts that you would be better off dead, or of hurting yourself in some way: 0 - Not at all    PHQ-9 TOTAL SCORE: 4  Depression Severity Level: none  If any answers were positive, how difficult have these problems made it for you to do your work, take care of things at home, or get along with other people: not difficult at all         Autoimmune disease (HC)  patient recently diagnosed with either dermatomyositis or polymyositis. She is followed by Dr. Susan Traylor who has started her on prednisone in addition to CellCept. She does run a  and she is wondering about her immune status to rubella.         Obesity (BMI 30.0-34.9)   BMI is now 33.5 for increased from 32 at time of last " office visit. She is on 20 mg as prednisone. Trying to eat healthy and does not exercise regularly  ALLERGIES:  Macrobid [nitrofurantoin]; Vicodin [hydrocodone-acetaminophen]; and Zoloft [sertraline]     Immunization History:  Immunization History     Administered  Date(s) Administered     Tdap, Unspecified 11/29/2007        CURRENT MEDICATIONS:   Current Outpatient Prescriptions       Medication  Sig Dispense Refill     ALPRAZolam (XANAX) 0.25 mg tablet Take 1 tablet by mouth 3 times daily if needed. 15 tablet 0     citalopram (CELEXA) 10 mg tablet TAKE ONE TABLET BY MOUTH EVERY DAY 90 tablet 1     cyclobenzaprine (FLEXERIL) 10 mg tablet Take 10 mg by mouth.       ibuprofen (ADVIL; MOTRIN) 800 mg tablet Take 800 mg by mouth every 8 hours if needed.       mycophenolate (CELLCEPT) 500 mg tablet   1     predniSONE (DELTASONE) 10 mg tablet Take 20 mg daily until further instruction  Indications: Inflammation       triamcinolone (ARISTOCORT; KENALOG) 0.1 % cream Apply  topically to affected area(s) 3 times daily. 1 Tube 0     No current facility-administered medications for this visit.      Medications have been reviewed by me and are current to the best of my knowledge and ability.    PROBLEM LIST:  Patient Active Problem List     Diagnosis  Code     CONSTIPATION, CHRONIC K59.09     NECK PAIN M54.2     BACK PAIN, CHRONIC M54.9     TMJ SYNDROME M26.609     DYSPLASTIC NEVUS D23.9     DEPRESSION, MILD, RECURRENT F33.0     NEPHROLITHIASIS N20.0     ACID REFLUX DISEASE K21.9     Fibromyalgia muscle pain M79.7     Panic disorder F41.0     Mixed hyperlipidemia E78.2     Obesity (BMI 30.0-34.9) E66.9       PAST MEDICAL HISTORY:  Past Medical History:     Diagnosis  Date     Gastroesophageal reflux disease      Kidney stone  8/13/2008    R kidney stone ct      Knee pain, bilateral      Learning disability     /depression ?ADHD      Mild recurrent major depression (HC) 2008     NICOTINE ADDICTION 1/11/2011    Using nicoderm  "patch        Tetanus-diphtheria (Td) vaccination 2007     SURGICAL HISTORY:  Past Surgical History:      Procedure  Laterality Date     TONSIL AND ADENOIDECTOMY  age 5     TYMPANOSTOMY  age 5    PC tubes        History      Smoking Status        Former Smoker       Packs/day:  0.50     Types:  Cigarettes     Quit date:  4/4/2013   Smokeless Tobacco        Never Used       Comment: information provided on NRT        SOCIAL HISTORY:  Social History     Social History        Marital status:       Spouse name: N/A     Number of children:  N/A     Years of education:  N/A     Occupational History      Not on file.     Social History Main Topics         Smoking status:   Former Smoker     Packs/day:  0.50     Types:  Cigarettes     Quit date:  4/4/2013     Smokeless tobacco:   Never Used      Comment: information provided on NRT      Alcohol use   No     Drug use:   No     Sexual activity:   Yes     Partners:  Male     Birth control/ protection:  Rhythm     Other Topics   Concern      Service  No     Blood Transfusions  No     Caffeine Concern  No     Occupational Exposure  No     Hobby Hazards  No     Sleep Concern  No     Stress Concern  No     Weight Concern  Yes     Special Diet  Yes     Back Care  No     Exercise  No     Bike Helmet  Not Asked     N/A      Seat Belt  Yes     100%      Self-Exams  Yes     Social History Narrative     Patient is living with her mother and her sister, her parents are     Love Kaye   Mother    Mariann Sister    Preloaded 3/19/2013.     FAMILY HISTORY:  Family History       Problem   Relation Age of Onset     Cancer  Maternal Grandmother      lung cancer       Asthma  Sister      Diabetes  Other        REVIEW OF SYSTEMS:    A comprehensive review of systems was negative except for items noted in HPI/Subjective.      EXAM:   /68  Pulse 96  Ht 1.537 m (5' 0.5\")  Wt 79.2 kg (174 lb 9.6 oz)  LMP 05/01/2017  Breastfeeding? No  BMI 33.54 kg/m2       General " Appearance: Normal., Pleasant, alert, appropriate appearance for age. No acute distress,  Psych-alert, oriented, and appropriate affect  HEENT-within normal limits   Neck Exam: Normal., Supple, no masses or nodes.  Thyroid Exam: No nodules or enlargement., normal to palpation  Lungs:  Clear to auscultation.  Cardiovascular Exam: Regular rate and rhythm. S1, S2, no murmur, click, gallop, or rubs.  Breast Exam: refused   Gastrointestinal Exam: Soft, nontender, no abnormal masses or organomegaly. Obese, no cvat  Genitourinary Exam Female: declined    Skin: no concerning moles, rashes, or lesions  Extremities:  NT, no edema       Results for orders placed or performed in visit on 05/09/17      RUBELLA IMMUNE STATUS      Result  Value Ref Range    RUBELLA REJI, QUANT     GLUCOSE, RANDOM      Result  Value Ref Range    GLUCOSE,RANDOM 129 (H) 70 - 105 mg/dL         ASSESSMENT/PLAN    ICD-10-CM    1. Routine physical examination Z00.00 RUBELLA IMMUNE STATUS      RUBELLA IMMUNE STATUS   2. Autoimmune disease (HC) M35.9 AMB CONSULT TO RHEUMATOLOGY      GLUCOSE, RANDOM      GLUCOSE, RANDOM   3. Bilateral breast cysts N60.01 US BREAST BILATERAL COMPLETE     N60.02    4. Obesity (BMI 30.0-34.9) E66.9      Ms. Haley's Body mass index is 33.54 kg/(m^2). This is out of the normal range for a 32 y.o. Normal range for ages 18+ is between 18.5 and 24.9. To lose weight we reviewed risks and benefits of appropriate options such as diet, exercise, and medications. Patient's strategy will be  self-directed nutrition plan and self-directed exercise program    BMI reviewed and discussed with patient.      Patient Instructions     Car Safety tips:   Wear your seatbelt at all times.   Do not drive when tired.  If drinking alcohol, find a designated .  Do NOT EVER text and drive!   Consider DRIVE [employer]DE mehnaz for your phone.     Referral to rheumatology provided  Weight Loss Strategies  1. Eat at least 3 meals a day and never skip  "breakfast.  2. Eat more slowly.  3. Decrease portion size.  4. Provide structure by using meal replacement bars or shakes, and/or low calorie frozen meals.  5. For good nutrition incorporate fruit, vegetables, whole grains, lean protein, and low-fat dairy.  6. Remove trigger foods from your environment to avoid impulse eating.  7. Increase physical activity: get a pedometer and aim for 10,000 steps a day or 30-35 minutes of activity 5 days per week.  8. Weigh yourself daily or at least weekly.  9. Keep a record of what you eat and your activity.  10. Establish a support system such as a friend, group or program.    11.  Read Adonis Walker's \"Eat to Live\"   12.   Remember it is important to have a minimum of 1400 calories a day, okay to use olive oil, 40 grams of fiber daily.  No more than two servings ( the size of your palm) of red meat a week.       Labs will be mailed to your home.   Work on following  a mediterranean diet; Use monosaturated fats ( olive , canola and peanut   oil; nuts, avocados), abundance of plant foods which are minimally processed, fish (salmon).  Exercise 30 minutes every day  [facility] staff will call you with your bilateral breast ultrasound appointment.   Try to get 7-8 hours sleep each night.  Rest is important!      Recommendations for females ages 18-40 years old:    Maintaining a few health-related habits can have a huge impact on your future health. Please consider the following general health recommendations:     Eat a quality diet (generally, low in simple sugars, starches, cholesterol and saturated fat.)    If your diet contains less than 4 servings of dairy products each day, take a Calcium 600mg/Vit D 200IU tablet twice daily to help maintain your bone strength.    If you are considering pregnancy begin a supplemental vitamin with 1 mg of folic acid daily.  Avoid alcohol when you think you might have conceived, as disability related to the fetal alcohol syndrome can occur with as few " as 1 to 2 drinks if consumed at a critical time in your baby's development.    Never smoke. Avoid chewing tobacco.    Limit alcohol to no more than 1-2 in 24 hours, as higher use increases your weight, can damage your liver or pancreas, and increases triglycerides (fat in the blood). Never drink and drive.    Avoid the use of recreational drugs.  Methamphetamine, for example, often causes addiction with the first use.    Exercise regularly.  Ideally you would have 30-60 minutes of aerobic exercise at least 4 times weekly.  Find something you enjoy and a friend to do it with you.    Maintain ideal weight.  Body mass index is 33.54 kg/(m^2).  Generally a BMI of 20-25 is considered ideal. Overweight is defined as 25-30, Obese is 30-35 and markedly obese is greater than 35.      Apply sun block (SPF 25 or greater) on exposed skin anytime you are out in the sun to prevent skin cancer.      Wear a seatbelt whenever you are in a car.    You should have a tetanus booster at least every 10 years.  Consider a flu shot every fall.    Immunization History     Administered  Date(s) Administered     Tdap, Unspecified 11/29/2007       Your cholesterol should be checked annually if high, and once every 5 years if normal.    You should have a pap  every 2 years between ages 21-30 and every 3 years between the ages of 30 and 70 unless you have had previous abnormal pap smear, (in these cases the exams and PAP's should be done yearly). If you have had hysterectomy in the past, your future Pap plan may be different.     Consider testing for sexually transmitted disease (STD) if you have had more than 2 partners in the last 5 years or have any other reason to believe you are at risk of infection.  The best way to minimize your risk of STD is to limit the number of partners you have and know their sexual history. Using condoms decreases your risk, but does not always prevent STD.

## 2018-01-05 NOTE — TELEPHONE ENCOUNTER
Patient Information     Patient Name MRN Maria G Hassan [id number] Female 1985      Telephone Encounter by Ann Marie Turner RN at 2017  3:48 PM     Author:  Ann Marie Turner RN Service:  (none) Author Type:  (none)     Filed:  2017  3:49 PM Encounter Date:  2017 Status:  Signed     :  Ann Marie Turner RN (NURS- Registered Nurse)            Depression-in adults 18 and over  SSRI    Office visit in the past 12 months or as indicated in chart.  Should have clinic visit 1-2 months after initial prescription.    Last visit with FERNY HAWKINS was on: 2017 in [facility]  Next visit with FERNY HAWKINS is on: No future appointment listed with this provider  Next visit with Family Practice is on: No future appointment listed in this department    Max refills 12 months from last office visit or per providers notes.      PHQ Depression Screening 2016   Date of PHQ exam (doc flow) 2016   1. Lack of interest/pleasure 0 - Not at all 0 - Not at all   2. Feeling down/depressed 0 - Not at all 0 - Not at all   PHQ-2 TOTAL SCORE 0 0   3. Trouble sleeping 1 - Several days 2 - More than half the days   4. Decreased energy 2 - More than half the days 0 - Not at all   5. Appetite change 2 - More than half the days 2 - More than half the days   6. Feelings of failure 0 - Not at all 0 - Not at all   7. Trouble concentrating 1 - Several days 0 - Not at all   8. Activity level 0 - Not at all 0 - Not at all   9. Hurting yourself 0 - Not at all 0 - Not at all   PHQ-9 TOTAL SCORE 6 4   PHQ-9 Severity Level mild none   Functional Impairment somewhat difficult not difficult at all     Prescription refilled per RN Medication Refill Policy.................... Ann Marie Turner ....................  2017   3:49 PM

## 2018-01-25 ENCOUNTER — DOCUMENTATION ONLY (OUTPATIENT)
Dept: FAMILY MEDICINE | Facility: OTHER | Age: 33
End: 2018-01-25

## 2018-01-25 PROBLEM — J84.9 INTERSTITIAL LUNG DISEASE (H): Status: ACTIVE | Noted: 2017-01-20

## 2018-01-25 PROBLEM — M33.20 POLYMYOSITIS (H): Status: ACTIVE | Noted: 2017-01-24

## 2018-01-25 RX ORDER — CYCLOBENZAPRINE HCL 10 MG
10 TABLET ORAL
COMMUNITY
Start: 2016-08-25 | End: 2019-10-31

## 2018-01-25 RX ORDER — IBUPROFEN 800 MG/1
800 TABLET, FILM COATED ORAL EVERY 8 HOURS PRN
COMMUNITY
End: 2024-01-11

## 2018-01-25 RX ORDER — PREDNISONE 10 MG/1
10 TABLET ORAL DAILY
COMMUNITY
Start: 2017-03-22 | End: 2021-12-29

## 2018-01-25 RX ORDER — ALPRAZOLAM 0.25 MG
0.25 TABLET ORAL 3 TIMES DAILY PRN
COMMUNITY
Start: 2013-08-09 | End: 2021-12-29

## 2018-01-25 RX ORDER — TRIAMCINOLONE ACETONIDE 1 MG/G
CREAM TOPICAL
COMMUNITY
Start: 2015-07-29 | End: 2019-10-31

## 2018-01-25 RX ORDER — MYCOPHENOLATE MOFETIL 500 MG/1
TABLET ORAL
COMMUNITY
Start: 2017-05-06 | End: 2021-12-29

## 2018-01-25 RX ORDER — CITALOPRAM HYDROBROMIDE 10 MG/1
10 TABLET ORAL DAILY
COMMUNITY
Start: 2017-05-11 | End: 2018-12-07

## 2018-01-26 VITALS
DIASTOLIC BLOOD PRESSURE: 68 MMHG | WEIGHT: 174.6 LBS | HEART RATE: 96 BPM | BODY MASS INDEX: 32.97 KG/M2 | SYSTOLIC BLOOD PRESSURE: 124 MMHG | HEIGHT: 61 IN

## 2018-01-26 VITALS
HEIGHT: 60 IN | WEIGHT: 175.4 LBS | BODY MASS INDEX: 34.44 KG/M2 | DIASTOLIC BLOOD PRESSURE: 80 MMHG | TEMPERATURE: 98.1 F | HEART RATE: 88 BPM | SYSTOLIC BLOOD PRESSURE: 110 MMHG

## 2018-01-29 ENCOUNTER — TRANSFERRED RECORDS (OUTPATIENT)
Dept: HEALTH INFORMATION MANAGEMENT | Facility: OTHER | Age: 33
End: 2018-01-29

## 2018-02-02 ASSESSMENT — PATIENT HEALTH QUESTIONNAIRE - PHQ9: SUM OF ALL RESPONSES TO PHQ QUESTIONS 1-9: 4

## 2018-02-02 ASSESSMENT — ANXIETY QUESTIONNAIRES: GAD7 TOTAL SCORE: 7

## 2018-02-05 ENCOUNTER — TRANSFERRED RECORDS (OUTPATIENT)
Dept: HEALTH INFORMATION MANAGEMENT | Facility: OTHER | Age: 33
End: 2018-02-05

## 2018-02-27 DIAGNOSIS — M33.20 POLYMYOSITIS (H): ICD-10-CM

## 2018-02-27 DIAGNOSIS — Z79.899 POLYPHARMACY: Primary | ICD-10-CM

## 2018-02-27 LAB
ALBUMIN SERPL-MCNC: 4.3 G/DL (ref 3.5–5.7)
ALP SERPL-CCNC: 72 U/L (ref 34–104)
ALT SERPL W P-5'-P-CCNC: 15 U/L (ref 7–52)
ANION GAP SERPL CALCULATED.3IONS-SCNC: 12 MMOL/L (ref 3–14)
AST SERPL W P-5'-P-CCNC: 15 U/L (ref 13–39)
BASOPHILS # BLD AUTO: 0.1 10E9/L (ref 0–0.2)
BASOPHILS NFR BLD AUTO: 0.4 %
BILIRUB SERPL-MCNC: 0.3 MG/DL (ref 0.3–1)
BUN SERPL-MCNC: 16 MG/DL (ref 7–25)
CALCIUM SERPL-MCNC: 9.6 MG/DL (ref 8.6–10.3)
CHLORIDE SERPL-SCNC: 102 MMOL/L (ref 98–107)
CHOLEST SERPL-MCNC: 251 MG/DL
CK SERPL-CCNC: 47 U/L (ref 30–223)
CO2 SERPL-SCNC: 24 MMOL/L (ref 21–31)
CREAT SERPL-MCNC: 0.69 MG/DL (ref 0.6–1.2)
CRP SERPL-MCNC: 1.1 MG/L
DEPRECATED CALCIDIOL+CALCIFEROL SERPL-MC: 50.1 NG/ML
DIFFERENTIAL METHOD BLD: ABNORMAL
EOSINOPHIL # BLD AUTO: 0.3 10E9/L (ref 0–0.7)
EOSINOPHIL NFR BLD AUTO: 2.7 %
ERYTHROCYTE [DISTWIDTH] IN BLOOD BY AUTOMATED COUNT: 14.8 % (ref 10–15)
ERYTHROCYTE [SEDIMENTATION RATE] IN BLOOD BY WESTERGREN METHOD: 28 MM/H (ref 1–15)
GFR SERPL CREATININE-BSD FRML MDRD: >90 ML/MIN/1.7M2
GLUCOSE SERPL-MCNC: 94 MG/DL (ref 70–105)
HCT VFR BLD AUTO: 35.9 % (ref 35–47)
HDLC SERPL-MCNC: 38 MG/DL (ref 23–92)
HGB BLD-MCNC: 11.6 G/DL (ref 11.7–15.7)
IMM GRANULOCYTES # BLD: 0.1 10E9/L (ref 0–0.4)
IMM GRANULOCYTES NFR BLD: 0.4 %
LDLC SERPL CALC-MCNC: 154 MG/DL
LYMPHOCYTES # BLD AUTO: 3 10E9/L (ref 0.8–5.3)
LYMPHOCYTES NFR BLD AUTO: 26.4 %
MCH RBC QN AUTO: 27 PG (ref 26.5–33)
MCHC RBC AUTO-ENTMCNC: 32.3 G/DL (ref 31.5–36.5)
MCV RBC AUTO: 84 FL (ref 78–100)
MONOCYTES # BLD AUTO: 1.2 10E9/L (ref 0–1.3)
MONOCYTES NFR BLD AUTO: 10.2 %
NEUTROPHILS # BLD AUTO: 6.8 10E9/L (ref 1.6–8.3)
NEUTROPHILS NFR BLD AUTO: 59.9 %
NONHDLC SERPL-MCNC: 213 MG/DL
PLATELET # BLD AUTO: 467 10E9/L (ref 150–450)
POTASSIUM SERPL-SCNC: 3.8 MMOL/L (ref 3.5–5.1)
PROT SERPL-MCNC: 8 G/DL (ref 6.4–8.9)
RBC # BLD AUTO: 4.29 10E12/L (ref 3.8–5.2)
SODIUM SERPL-SCNC: 138 MMOL/L (ref 134–144)
TRIGL SERPL-MCNC: 295 MG/DL
WBC # BLD AUTO: 11.3 10E9/L (ref 4–11)

## 2018-02-27 PROCEDURE — 82306 VITAMIN D 25 HYDROXY: CPT

## 2018-02-27 PROCEDURE — 84443 ASSAY THYROID STIM HORMONE: CPT

## 2018-02-27 PROCEDURE — 82550 ASSAY OF CK (CPK): CPT

## 2018-02-27 PROCEDURE — 36415 COLL VENOUS BLD VENIPUNCTURE: CPT

## 2018-02-27 PROCEDURE — 85652 RBC SED RATE AUTOMATED: CPT

## 2018-02-27 PROCEDURE — 85025 COMPLETE CBC W/AUTO DIFF WBC: CPT

## 2018-02-27 PROCEDURE — 86140 C-REACTIVE PROTEIN: CPT

## 2018-02-27 PROCEDURE — 80053 COMPREHEN METABOLIC PANEL: CPT

## 2018-02-27 PROCEDURE — 80061 LIPID PANEL: CPT

## 2018-02-28 LAB — TSH SERPL DL<=0.005 MIU/L-ACNC: 1.19 MU/L (ref 0.4–4)

## 2018-03-12 ENCOUNTER — TRANSFERRED RECORDS (OUTPATIENT)
Dept: HEALTH INFORMATION MANAGEMENT | Facility: OTHER | Age: 33
End: 2018-03-12

## 2018-06-13 ENCOUNTER — TRANSFERRED RECORDS (OUTPATIENT)
Dept: HEALTH INFORMATION MANAGEMENT | Facility: OTHER | Age: 33
End: 2018-06-13

## 2018-07-23 NOTE — PROGRESS NOTES
Patient Information     Patient Name  Maria G Haley MRN  [id number] Sex  Female   1985      Letter by Neelam Schwartz MD at      Author:  Neelam Schwartz MD Service:  (none) Author Type:  (none)    Filed:   Encounter Date:  2017 Status:  (Other)       Remember to activate your account quickly -   Your activation code expires in 45 days!    2017    Maria G Haley  [address]  [facility] [address]    Dear Ms. Haley,    This letter is being sent to you in response to the call you placed to [employer] about not being able to access your [employer] account. For security reasons, you will need the following to  reactivate  your account:    access to the [employer] web site [[email or web address]]     your [employer] activation code:  [email or web address]    Expires: 2017 12:37 PM    last 4 digits of your Social Security number (SSN)    your date of birth.      Step-by-step instructions on how to reactivate your [employer] account are shown on the following page. If you requested access to your child or dependent s [employer] account, instructions for getting access to your child or dependent s account are also on the following page.     Please call [employer] at [phone] if you have any questions about the instructions - or if you feel you have received this letter in error.    Thank you for choosing [employer] from [employer].      Sincerely,        [employer]                Instructions For Activating Your [employer] Account    Activation Code:  [email or web address]  Expires: 2017 12:37 PM    Following are step-by-step instructions for setting up your [employer] log-in ID and personal password.  In the event you have technical difficulties, please call [employer] at [phone].    Step 1:   Go to the [employer] web page by typing [email or web address] in your Internet browser.      Step 2:   Click on the Enter your Activation Code link on the right side  of the page under the New User? heading.     Step 3:   Enter your activation code (provided above), last 4 digits of your Social Security Number (SSN) and date of birth.  This information is only required once.  The next time you log in to [employer], you will only need your [employer] ID and the password that you will create as part of this activation process.    Step 4:   The [employer] ID you used previously will be listed as the [employer] ID.   Please make a note of your [employer] ID. If you forget it, you will need to call [employer] to go through the reactivation process again or click on the Forgot [employer] ID link on the [employer] web page and follow the instructions.       Step 5:   Next, choose a password that is easy for you to remember, but hard for others to guess.  Your [employer] password must be a minimum of 7 characters and must contain at least one number and one letter.  No symbols are permitted.  Please keep in mind that only you will know your password and ID; [employer] will not have access to either of them.      Step 6:    Choose a security question. This will allow you to reset your password if you forget it or want to change it for some reason.    Step 7:    Enter the answer to your security question.  Please keep in mind that youranswer cannot be the same as your [employer] password.    Step 8:  Enter your e-mail address.  Your e-mail address is a required field.  You will receive an e-mail notification when new information is available in your [employer] account.  You are now ready to start using [employer]!      Instructions for Child/Dependent Access    To view your child s record, click on your child's name under the My Family s Records   heading.  Instructions for Adult Proxy Access    To view your adult proxy record, click on the adult's name under the My Family s   Records heading.

## 2018-07-23 NOTE — PROGRESS NOTES
Patient Information     Patient Name  Maria G Haley MRN  [id number] Sex  Female   1985      Letter by Neelam Schwartz MD at      Author:  Neelam Schwartz MD Service:  (none) Author Type:  (none)    Filed:   Encounter Date:  2017 Status:  (Other)           Maria G Haley  [address]  [facility] [address]          May 10, 2017    Dear Ms. Haley:    Following are the tests completed during your last clinic visit.  The results of these tests are normal and require no further attention unless otherwise noted.  Your non fasting blood sugar ( 90 minutes from when you last ate ) noted below.  Remember that prednisone can elevated your blood sugars.   Your rubella titre shows immunity.               Results for orders placed or performed in visit on 17      RUBELLA IMMUNE STATUS      Result  Value Ref Range    RUBELLA REJI, QUANT Positive     GLUCOSE, RANDOM      Result  Value Ref Range    GLUCOSE,RANDOM 129 (H) 70 - 105 mg/dL         If you have any further questions or problems contact my office.    Sincerely,         Electronically signed by Neelam Schwartz MD

## 2018-08-31 NOTE — TELEPHONE ENCOUNTER
"Refill request from [facility] for:  citalopram (CELEXA) 10 MG tablet    LOV 5/9/2017 with Neelam Schwartz MD  History of depression   On celexa.  Doing well.  Feels 'much much better on the prednisone\".  \" It's like a whole new life\".      Letter out 5/10/2017  \"May 10, 2017    Dear Ms. Haley:    Following are the tests completed during your last clinic visit.  The results of these tests are normal and require no further attention unless otherwise noted.  Your non fasting blood sugar ( 90 minutes from when you last ate ) noted below.  Remember that prednisone can elevated your blood sugars.   Your rubella titre shows immunity.\"  Dr. Schwartz    Patient has no upcoming appt noted at this time to establish care with new provider.      Attempted to contact patient to assist them in scheduling an appt with a new provider..  No answer. Left message to [facility].    9/11/2018 No return call noted.  Will refuse medication request at this time.    Requested Prescriptions   Pending Prescriptions Disp Refills     citalopram (CELEXA) 10 MG tablet [Pharmacy Med Name: CITALOPRAM 10MG     TAB] 90 tablet 3     Sig: TAKE ONE TABLET BY MOUTH ONCE DAILY    SSRIs Protocol Failed    8/29/2018  8:00 AM       Failed - Recent (12 mo) or future (30 days) visit within the authorizing provider's specialty    Patient had office visit in the last 12 months or has a visit in the next 30 days with authorizing provider or within the authorizing provider's specialty.  See \"Patient Info\" tab in inbasket, or \"Choose Columns\" in Meds & Orders section of the refill encounter.           Passed - Patient is age 18 or older       Passed - No active pregnancy on record       Passed - No positive pregnancy test in last 12 months        Medication Detail      Disp Refills Start End ERVIN   citalopram (CELEXA) 10 mg tablet 90 tablet 3 5/11/2017  No   Sig: Take 1 tablet by mouth once daily.   Class: eRx   Route: Oral   Cosign for Ordering: Accepted by Lana, " Neelam Espinal MD on 5/11/2017  4:10 PM   E-Prescribing Status: Receipt confirmed by pharmacy (5/11/2017  3:49 PM CDT)   Associated Diagnoses     Generalized anxiety disorder       Unable to complete prescription refill per RN Medication Refill Policy.................... Brionna Cheema ....................  9/11/2018   11:12 AM

## 2018-09-11 RX ORDER — CITALOPRAM HYDROBROMIDE 10 MG/1
TABLET ORAL
Qty: 90 TABLET | Refills: 3 | OUTPATIENT
Start: 2018-09-11

## 2018-10-23 ENCOUNTER — TRANSFERRED RECORDS (OUTPATIENT)
Dept: HEALTH INFORMATION MANAGEMENT | Facility: OTHER | Age: 33
End: 2018-10-23

## 2018-11-28 ENCOUNTER — TRANSFERRED RECORDS (OUTPATIENT)
Dept: HEALTH INFORMATION MANAGEMENT | Facility: OTHER | Age: 33
End: 2018-11-28

## 2018-12-07 ENCOUNTER — TELEPHONE (OUTPATIENT)
Dept: INTERNAL MEDICINE | Facility: OTHER | Age: 33
End: 2018-12-07

## 2018-12-07 DIAGNOSIS — F41.0 PANIC DISORDER: Primary | ICD-10-CM

## 2018-12-07 RX ORDER — CITALOPRAM HYDROBROMIDE 10 MG/1
10 TABLET ORAL DAILY
Qty: 30 TABLET | Refills: 0 | Status: SHIPPED | OUTPATIENT
Start: 2018-12-07 | End: 2018-12-18

## 2018-12-07 NOTE — TELEPHONE ENCOUNTER
Patient has an appointment with you on 12-18-18. She says that she is completely out of Celexa as of now. She is wondering if she can get a short term supply sent to [facility] pharmacy until then. Please advise. She would like a call back when/if done  Lilli London LPN...................12/7/2018   9:01 AM

## 2018-12-07 NOTE — TELEPHONE ENCOUNTER
Pt is out of selexa, pt has upcoming appt with RKV, wants to know if she can get meds before appt?

## 2018-12-18 ENCOUNTER — OFFICE VISIT (OUTPATIENT)
Dept: INTERNAL MEDICINE | Facility: OTHER | Age: 33
End: 2018-12-18
Attending: NURSE PRACTITIONER
Payer: COMMERCIAL

## 2018-12-18 VITALS
BODY MASS INDEX: 37.26 KG/M2 | RESPIRATION RATE: 20 BRPM | HEART RATE: 76 BPM | TEMPERATURE: 96.9 F | SYSTOLIC BLOOD PRESSURE: 116 MMHG | WEIGHT: 194 LBS | DIASTOLIC BLOOD PRESSURE: 82 MMHG

## 2018-12-18 DIAGNOSIS — J84.9 INTERSTITIAL LUNG DISEASE (H): ICD-10-CM

## 2018-12-18 DIAGNOSIS — F41.0 PANIC DISORDER: Primary | ICD-10-CM

## 2018-12-18 DIAGNOSIS — M33.20 POLYMYOSITIS (H): ICD-10-CM

## 2018-12-18 PROCEDURE — 99213 OFFICE O/P EST LOW 20 MIN: CPT | Performed by: NURSE PRACTITIONER

## 2018-12-18 RX ORDER — CITALOPRAM HYDROBROMIDE 10 MG/1
10 TABLET ORAL DAILY
Qty: 90 TABLET | Refills: 3 | Status: SHIPPED | OUTPATIENT
Start: 2018-12-18 | End: 2020-02-12

## 2018-12-18 ASSESSMENT — ANXIETY QUESTIONNAIRES
GAD7 TOTAL SCORE: 0
7. FEELING AFRAID AS IF SOMETHING AWFUL MIGHT HAPPEN: NOT AT ALL
5. BEING SO RESTLESS THAT IT IS HARD TO SIT STILL: NOT AT ALL
3. WORRYING TOO MUCH ABOUT DIFFERENT THINGS: NOT AT ALL
2. NOT BEING ABLE TO STOP OR CONTROL WORRYING: NOT AT ALL
1. FEELING NERVOUS, ANXIOUS, OR ON EDGE: NOT AT ALL
6. BECOMING EASILY ANNOYED OR IRRITABLE: NOT AT ALL
IF YOU CHECKED OFF ANY PROBLEMS ON THIS QUESTIONNAIRE, HOW DIFFICULT HAVE THESE PROBLEMS MADE IT FOR YOU TO DO YOUR WORK, TAKE CARE OF THINGS AT HOME, OR GET ALONG WITH OTHER PEOPLE: NOT DIFFICULT AT ALL

## 2018-12-18 ASSESSMENT — PAIN SCALES - GENERAL: PAINLEVEL: NO PAIN (0)

## 2018-12-18 ASSESSMENT — PATIENT HEALTH QUESTIONNAIRE - PHQ9
5. POOR APPETITE OR OVEREATING: NOT AT ALL
SUM OF ALL RESPONSES TO PHQ QUESTIONS 1-9: 0

## 2018-12-18 NOTE — PROGRESS NOTES
Subjective:  She is here today for medication renewal.  She takes Celexa 10 mg daily for panic disorder.  This is well controlled.  She also has alprazolam as needed.  She does not need a refill the alprazolam.  She tolerate Celexa without difficulty.  Denies recent panic attack and symptoms of depression and suicide ideation.  She has polymyositis and interstitial lung disease.  She is managed by rheumatology and pulmonology at [facility].  She is on mycophenolate and prednisone daily.  She is planning to establish care with Dr. Nava in the near future.    Patient Active Problem List   Diagnosis     Acid reflux disease     Constipation, chronic     Dysplastic nevus     Fibromyalgia muscle pain     Interstitial lung disease (H)     Mixed hyperlipidemia     Nephrolithiasis     Obesity (BMI 30.0-34.9)     Panic disorder     Polymyositis (H)     Severe episode of recurrent major depressive disorder, without psychotic features (H)     Temporomandibular joint disorder     Past Medical History:   Diagnosis Date     Calculus of kidney      8/13/2008,R kidney stone ct     Developmental disorder of scholastic skills     /depression ?ADHD     Encounter for immunization     2007     Gastro-esophageal reflux disease without esophagitis     No Comments Provided     Major depressive disorder, recurrent, mild (H)     2008     Nicotine dependence, uncomplicated     1/11/2011,Using nicoderm patch     Pain in left knee     No Comments Provided     Past Surgical History:   Procedure Laterality Date     TONSILLECTOMY, ADENOIDECTOMY, COMBINED      age 5     TYMPANOSTOMY, LOCAL/TOPICAL ANESTHESIA      age 5,PC tubes     Social History     Socioeconomic History     Marital status:      Spouse name: Not on file     Number of children: Not on file     Years of education: Not on file     Highest education level: Not on file   Social Needs     Financial resource strain: Not on file     Food insecurity - worry: Not on file     Food  insecurity - inability: Not on file     Transportation needs - medical: Not on file     Transportation needs - non-medical: Not on file   Occupational History     Occupation:    Tobacco Use     Smoking status: Former Smoker     Packs/day: 0.50     Types: Cigarettes     Last attempt to quit: 2013     Years since quittin.7     Smokeless tobacco: Never Used     Tobacco comment: Quit smoking: information provided on NRT   Substance and Sexual Activity     Alcohol use: No     Alcohol/week: 0.0 oz     Drug use: Unknown     Types: Other     Comment: Drug use: No     Sexual activity: Yes     Partners: Male     Birth control/protection: Rhythm, Rhythm   Other Topics Concern     Not on file   Social History Narrative    Patient is living with her mother and her sister, her parents are     Love Kaye   Mother    Mariann Sister    Preloaded 3/19/2013.     Family History   Problem Relation Age of Onset     Cancer Maternal Grandmother         Cancer,lung cancer     Diabetes Other         Diabetes     Asthma Sister         Asthma     Current Outpatient Medications   Medication Sig Dispense Refill     ALPRAZolam (XANAX) 0.25 MG tablet Take 0.25 mg by mouth 3 times daily as needed       citalopram (CELEXA) 10 MG tablet Take 1 tablet (10 mg) by mouth daily 90 tablet 3     cyclobenzaprine (FLEXERIL) 10 MG tablet Take 10 mg by mouth       ibuprofen (ADVIL/MOTRIN) 800 MG tablet Take 800 mg by mouth every 8 hours as needed       mycophenolate (GENERIC EQUIVALENT) 500 MG tablet        predniSONE (DELTASONE) 10 MG tablet Take 20 mg by mouth daily until further instruction. Indication: inflammation.       triamcinolone (KENALOG) 0.1 % cream        Hydrocodone-acetaminophen; Nitrofurantoin; and Sertraline      Review of Systems:  Review of Systems  See HPI  Objective:   /82   Pulse 76   Temp 96.9  F (36.1  C) (Tympanic)   Resp 20   Wt 88 kg (194 lb)   LMP 2018 (Approximate)   Breastfeeding? No   BMI  37.26 kg/m    Physical Exam  Pleasant female no acute distress.  Affect normal.  Alert and oriented x4.  Skin color pink.  Mucous members moist.  Lung fields clear to auscultation.  Cardiovascular regular rate and rhythm.  Gait is stable.  PHQ 9 score 0; VICKY 7 score 0  Assessment:    ICD-10-CM    1. Panic disorder F41.0 citalopram (CELEXA) 10 MG tablet   2. Polymyositis (H) M33.20    3. Interstitial lung disease (H) J84.9        Plan:   1.  Celexa 10 mg daily is refilled.  Currently stable.  2.  She has polymyositis and interstitial lung disease.  She is planning to establish care with Dr. Nava in the near future.  Into ongoing management with rheumatology and pulmonology.    SHANAE Santizo   12/18/2018  3:14 PM

## 2018-12-18 NOTE — NURSING NOTE
Patient here today for medication management and refills. Pt sees a RA specialist in [place].     Chief Complaint   Patient presents with     Recheck Medication         Medication Reconciliation: complete    Lexi Oreilly LPN

## 2018-12-19 ASSESSMENT — ANXIETY QUESTIONNAIRES: GAD7 TOTAL SCORE: 0

## 2019-07-16 ENCOUNTER — TELEPHONE (OUTPATIENT)
Dept: INTERNAL MEDICINE | Facility: OTHER | Age: 34
End: 2019-07-16

## 2019-07-16 NOTE — TELEPHONE ENCOUNTER
Patient missed her appointment with you on 7/15/2019 and is wondering if she can still see you    Please call to advise    OK to wait for SK to be back in clinic    Thank you

## 2019-07-17 NOTE — TELEPHONE ENCOUNTER
Called and left message with patient to please return call.     Lexi Oreilly LPN on 7/17/2019 at 8:24 AM

## 2019-07-17 NOTE — TELEPHONE ENCOUNTER
Patient has missed a couple appointments with me now.  She can reschedule with the next available 40 minute appt for annual px.  If she is going to miss her appointment she needs to cancel at least 1 day in advance.

## 2019-07-17 NOTE — TELEPHONE ENCOUNTER
Called and spoke with patient. Verified patients full name and . Notified her of the below. Transferred patient to scheduling.     Lexi Oreilly LPN on 2019 at 9:48 AM

## 2019-10-29 PROBLEM — K44.9 LARGE HIATAL HERNIA: Status: ACTIVE | Noted: 2019-10-29

## 2019-10-29 NOTE — PROGRESS NOTES
Nursing Notes:   Laura Real LPN  10/31/2019 11:58 AM  Signed  Patient presents to the clinic for establish care.  Patient would also like to have her skin checked-concerned about possible skin cancer.    Chief Complaint   Patient presents with     Establish Care     Derm Problem       Initial /70 (BP Location: Right arm, Patient Position: Sitting, Cuff Size: Adult Regular)   Pulse 80   Temp 97.8  F (36.6  C) (Tympanic)   Resp 18   Ht 1.524 m (5')   Wt 91.9 kg (202 lb 8 oz)   LMP 10/24/2019 (Approximate)   BMI 39.55 kg/m    Estimated body mass index is 39.55 kg/m  as calculated from the following:    Height as of this encounter: 1.524 m (5').    Weight as of this encounter: 91.9 kg (202 lb 8 oz).  Medication Reconciliation: complete    Laura Real LPN    Nursing note reviewed with patient.  Accuracy and completeness verified.   Ms. Haley is a 34 year old female who:  Patient presents with:  Establish Care  Derm Problem      ICD-10-CM    1. Panic disorder F41.0    2. Mixed hyperlipidemia E78.2    3. Large hiatal hernia K44.9 sucralfate (CARAFATE) 1 GM tablet   4. Heartburn R12 sucralfate (CARAFATE) 1 GM tablet   5. Polymyositis (H) M33.20 metaxalone (SKELAXIN) 800 MG tablet   6. Interstitial lung disease (H) J84.9 tiotropium (SPIRIVA) 18 MCG inhaled capsule   7. Non-healing skin lesion L98.9 GENERAL SURG ADULT REFERRAL   8. Immunocompromised state (H) D89.9      HPI  She has polymyositis and interstitial lung disease.  She is managed by rheumatology and pulmonology at [facility].  She is on mycophenolate and prednisone daily.    + States meds were recently filled with Chelsy Jimenez NP otherwise her rheumatoid arthritis doctor has been prescribing.     Mixed hyperlipidemia, currently diet controlled.  May need to start statin therapy in the future.    Heartburn a hiatal hernia, reports relatively chronic, ongoing issues with this.  Needs to take omeprazole regularly.  Discussed trial of  Carafate.  Prescription sent to pharmacy.    Polymyositis, has some chronic musculoskeletal pain.  She would like to take something to help with muscle spasms.  Start trial of metaxalone.  Uncertain about cost and coverage, small prescription printed to check on pricing.    Interstitial lung disease, states that she gets a lot of phlegm and mucus and has used albuterol inhaler in the past but she is wondering about something else.  Discussed trial of tiotropium.  Prescription sent to pharmacy.    Skin lesions, has a few different lesions, sometimes they are not healing.  She has one on her upper back one on her left buttocks, has one in her thigh area as well.  She did not show me most of these today.  She would like to have the surgeon look at them versus dermatology appointment.  The one in the upper left shoulder may be a dermatofibroma, nonetheless she has been picking at it at times and states that it is getting bigger and it is bothering her and she would like to have it removed.  Surgical referral sent for procedure only appointment.    Functional Capacity: > 4 METS.   Review of Systems   Constitutional: Negative for chills and fever.   HENT: Negative for congestion and hearing loss.    Eyes: Negative for visual disturbance.   Respiratory: Positive for cough and shortness of breath. Negative for wheezing.    Cardiovascular: Negative for chest pain and palpitations.   Gastrointestinal: Positive for nausea. Negative for abdominal pain, diarrhea and vomiting.        + Heartburn, nausea   Endocrine: Negative for cold intolerance and heat intolerance.   Genitourinary: Negative for dysuria and hematuria.   Musculoskeletal: Positive for myalgias. Negative for arthralgias.   Skin: Negative for rash and wound.        + few skin lesions that don't heal up   Allergic/Immunologic: Positive for immunocompromised state.   Neurological: Negative for dizziness and light-headedness.   Hematological: Does not bruise/bleed  easily.   Psychiatric/Behavioral: Negative for agitation and confusion.        Problem List/PMH: reviewed in EMR, and made relevant updates today.  Medications: reviewed in EMR, and made relevant updates today.  Allergies: reviewed in EMR, and made relevant updates today.  I reviewed family and social history and made relevant updates today.  Social History     Tobacco Use     Smoking status: Former Smoker     Packs/day: 0.50     Types: Cigarettes     Last attempt to quit: 2013     Years since quittin.5     Smokeless tobacco: Never Used     Tobacco comment: Quit smoking: information provided on NRT   Substance Use Topics     Alcohol use: No     Alcohol/week: 0.0 standard drinks     Drug use: Never      Family History   Problem Relation Age of Onset     Cancer Maternal Grandmother         Cancer,lung cancer     Diabetes Other         Diabetes     Asthma Sister         Asthma       EXAM:   Vitals:    10/31/19 1113   BP: 126/70   BP Location: Right arm   Patient Position: Sitting   Cuff Size: Adult Regular   Pulse: 80   Resp: 18   Temp: 97.8  F (36.6  C)   TempSrc: Tympanic   Weight: 91.9 kg (202 lb 8 oz)   Height: 1.524 m (5')       Current Pain Score: Mild Pain (3)     BP Readings from Last 3 Encounters:   10/31/19 126/70   18 116/82   17 124/68      Wt Readings from Last 3 Encounters:   10/31/19 91.9 kg (202 lb 8 oz)   18 88 kg (194 lb)   17 79.2 kg (174 lb 9.6 oz)      Estimated body mass index is 39.55 kg/m  as calculated from the following:    Height as of this encounter: 1.524 m (5').    Weight as of this encounter: 91.9 kg (202 lb 8 oz).     Physical Exam  Constitutional:       General: She is not in acute distress.     Appearance: She is well-developed. She is not diaphoretic.   HENT:      Head: Normocephalic and atraumatic.   Eyes:      General: No scleral icterus.     Conjunctiva/sclera: Conjunctivae normal.   Neck:      Musculoskeletal: Neck supple.   Cardiovascular:       Rate and Rhythm: Normal rate and regular rhythm.   Pulmonary:      Effort: Pulmonary effort is normal.      Breath sounds: Normal breath sounds.      Comments: + expiratory wheezing, increased pulmonary noise  Abdominal:      Palpations: Abdomen is soft.      Tenderness: There is no tenderness.   Musculoskeletal:         General: No deformity.   Lymphadenopathy:      Cervical: No cervical adenopathy.   Skin:     General: Skin is warm and dry.      Findings: No rash.      Comments: + skin lesion on left upper back   Neurological:      General: No focal deficit present.      Mental Status: She is alert.   Psychiatric:         Mood and Affect: Mood normal.         Behavior: Behavior normal.          Procedures   INVESTIGATIONS:  No results found for any visits on 10/31/19.    ASSESSMENT AND PLAN:  Problem List Items Addressed This Visit        Respiratory    Interstitial lung disease (H)    Relevant Medications    tiotropium (SPIRIVA) 18 MCG inhaled capsule       Digestive    Heartburn    Relevant Medications    sucralfate (CARAFATE) 1 GM tablet       Endocrine    Mixed hyperlipidemia       Musculoskeletal and Integumentary    Polymyositis (H)    Relevant Medications    metaxalone (SKELAXIN) 800 MG tablet    Large hiatal hernia    Relevant Medications    sucralfate (CARAFATE) 1 GM tablet       Behavioral    Panic disorder - Primary       Other    Immunocompromised state (H)      Other Visit Diagnoses     Non-healing skin lesion        Relevant Orders    GENERAL SURG ADULT REFERRAL        reviewed diet, exercise and weight control  -- Expected clinical course discussed    -- Medications and their side effects discussed    Patient Instructions   General Surgery referral sent - they will call with date/time of appointment.     Procedure only appointment - left upper back and possibly left buttocks skin lesion removal     Call if wanting dermatology referral.     Polymyositis (H)  START:   - metaxalone (SKELAXIN) 800 MG  tablet; Take 0.5-1 tablets (400-800 mg) by mouth 4 times daily as needed for muscle soreness or moderate pain  Dispense: 30 tablet; Refill: 11    Large hiatal hernia  Heartburn  START:   - sucralfate (CARAFATE) 1 GM tablet; Take 1 tablet (1 g) by mouth 4 times daily as needed (heartburn)  Dispense: 120 tablet; Refill: 11    Interstitial lung disease (H)  START:   - tiotropium (SPIRIVA) 18 MCG inhaled capsule; Inhale 1 capsule (18 mcg) into the lungs daily Take 2 inhalations off each capsule - for phlegm/mucus  Dispense: 30 capsule; Refill: 11    Return as needed for follow-up with Dr. Berry.    Clinic : [phone]  Appointment line: [phone]     Jluis Berry MD  Internal Medicine  [facility]     Portions of this note were dictated using speech recognition software. The note has been proofread but errors in the text may have been overlooked. Please contact me if there are any concerns regarding the accuracy of the dictation.

## 2019-10-31 ENCOUNTER — OFFICE VISIT (OUTPATIENT)
Dept: INTERNAL MEDICINE | Facility: OTHER | Age: 34
End: 2019-10-31
Attending: INTERNAL MEDICINE
Payer: COMMERCIAL

## 2019-10-31 ENCOUNTER — TELEPHONE (OUTPATIENT)
Dept: INTERNAL MEDICINE | Facility: OTHER | Age: 34
End: 2019-10-31

## 2019-10-31 VITALS
HEART RATE: 80 BPM | HEIGHT: 60 IN | TEMPERATURE: 97.8 F | SYSTOLIC BLOOD PRESSURE: 126 MMHG | BODY MASS INDEX: 39.76 KG/M2 | WEIGHT: 202.5 LBS | DIASTOLIC BLOOD PRESSURE: 70 MMHG | RESPIRATION RATE: 18 BRPM

## 2019-10-31 DIAGNOSIS — M33.20 POLYMYOSITIS (H): ICD-10-CM

## 2019-10-31 DIAGNOSIS — K44.9 LARGE HIATAL HERNIA: ICD-10-CM

## 2019-10-31 DIAGNOSIS — E78.2 MIXED HYPERLIPIDEMIA: ICD-10-CM

## 2019-10-31 DIAGNOSIS — F41.0 PANIC DISORDER: Primary | ICD-10-CM

## 2019-10-31 DIAGNOSIS — R12 HEARTBURN: ICD-10-CM

## 2019-10-31 DIAGNOSIS — L98.9 NON-HEALING SKIN LESION: ICD-10-CM

## 2019-10-31 DIAGNOSIS — D84.9 IMMUNOCOMPROMISED STATE (H): ICD-10-CM

## 2019-10-31 DIAGNOSIS — M79.7 FIBROMYALGIA MUSCLE PAIN: Primary | ICD-10-CM

## 2019-10-31 DIAGNOSIS — J84.9 INTERSTITIAL LUNG DISEASE (H): ICD-10-CM

## 2019-10-31 PROCEDURE — 99214 OFFICE O/P EST MOD 30 MIN: CPT | Performed by: INTERNAL MEDICINE

## 2019-10-31 RX ORDER — TIZANIDINE 2 MG/1
2-4 TABLET ORAL 3 TIMES DAILY PRN
Qty: 30 TABLET | Refills: 3 | Status: SHIPPED | OUTPATIENT
Start: 2019-10-31 | End: 2022-05-12

## 2019-10-31 RX ORDER — METAXALONE 800 MG/1
400-800 TABLET ORAL 4 TIMES DAILY PRN
Qty: 30 TABLET | Refills: 11 | Status: SHIPPED | OUTPATIENT
Start: 2019-10-31 | End: 2022-05-12

## 2019-10-31 RX ORDER — OMEPRAZOLE 40 MG/1
40 CAPSULE, DELAYED RELEASE ORAL DAILY
COMMUNITY
Start: 2019-10-31 | End: 2020-11-16

## 2019-10-31 RX ORDER — TIOTROPIUM BROMIDE 18 UG/1
18 CAPSULE ORAL; RESPIRATORY (INHALATION) DAILY
Qty: 30 CAPSULE | Refills: 11 | Status: SHIPPED | OUTPATIENT
Start: 2019-10-31 | End: 2019-11-04

## 2019-10-31 RX ORDER — SUCRALFATE 1 G/1
1 TABLET ORAL 4 TIMES DAILY PRN
Qty: 120 TABLET | Refills: 11 | Status: SHIPPED | OUTPATIENT
Start: 2019-10-31 | End: 2022-05-12

## 2019-10-31 ASSESSMENT — PATIENT HEALTH QUESTIONNAIRE - PHQ9
SUM OF ALL RESPONSES TO PHQ QUESTIONS 1-9: 0
5. POOR APPETITE OR OVEREATING: NOT AT ALL

## 2019-10-31 ASSESSMENT — ENCOUNTER SYMPTOMS
ABDOMINAL PAIN: 0
MYALGIAS: 1
DIZZINESS: 0
FEVER: 0
WOUND: 0
ARTHRALGIAS: 0
SHORTNESS OF BREATH: 1
NAUSEA: 1
PALPITATIONS: 0
AGITATION: 0
DYSURIA: 0
COUGH: 1
HEMATURIA: 0
CONFUSION: 0
WHEEZING: 0
LIGHT-HEADEDNESS: 0
VOMITING: 0
BRUISES/BLEEDS EASILY: 0
DIARRHEA: 0
CHILLS: 0

## 2019-10-31 ASSESSMENT — PAIN SCALES - GENERAL: PAINLEVEL: MILD PAIN (3)

## 2019-10-31 ASSESSMENT — ANXIETY QUESTIONNAIRES
6. BECOMING EASILY ANNOYED OR IRRITABLE: NOT AT ALL
GAD7 TOTAL SCORE: 0
IF YOU CHECKED OFF ANY PROBLEMS ON THIS QUESTIONNAIRE, HOW DIFFICULT HAVE THESE PROBLEMS MADE IT FOR YOU TO DO YOUR WORK, TAKE CARE OF THINGS AT HOME, OR GET ALONG WITH OTHER PEOPLE: NOT DIFFICULT AT ALL
3. WORRYING TOO MUCH ABOUT DIFFERENT THINGS: NOT AT ALL
1. FEELING NERVOUS, ANXIOUS, OR ON EDGE: NOT AT ALL
5. BEING SO RESTLESS THAT IT IS HARD TO SIT STILL: NOT AT ALL
7. FEELING AFRAID AS IF SOMETHING AWFUL MIGHT HAPPEN: NOT AT ALL
2. NOT BEING ABLE TO STOP OR CONTROL WORRYING: NOT AT ALL

## 2019-10-31 ASSESSMENT — MIFFLIN-ST. JEOR: SCORE: 1540.03

## 2019-10-31 NOTE — PATIENT INSTRUCTIONS
General Surgery referral sent - they will call with date/time of appointment.     Procedure only appointment - left upper back and possibly left buttocks skin lesion removal     Call if wanting dermatology referral.     Polymyositis (H)  START:   - metaxalone (SKELAXIN) 800 MG tablet; Take 0.5-1 tablets (400-800 mg) by mouth 4 times daily as needed for muscle soreness or moderate pain  Dispense: 30 tablet; Refill: 11    Large hiatal hernia  Heartburn  START:   - sucralfate (CARAFATE) 1 GM tablet; Take 1 tablet (1 g) by mouth 4 times daily as needed (heartburn)  Dispense: 120 tablet; Refill: 11    Interstitial lung disease (H)  START:   - tiotropium (SPIRIVA) 18 MCG inhaled capsule; Inhale 1 capsule (18 mcg) into the lungs daily Take 2 inhalations off each capsule - for phlegm/mucus  Dispense: 30 capsule; Refill: 11    Return as needed for follow-up with Dr. Berry.    Clinic : [phone]  Appointment line: [phone]

## 2019-10-31 NOTE — TELEPHONE ENCOUNTER
Maria G brought us a Rx for metaxalone. This drug is not covered by her insurance. Options that are covered include: cyclobenzaprine, tizanidine, methocarbamol, baclofen, and orphenadrine. Please send a new Rx for one of these other agents if appropriate.    Thanks    Yahir Shaw, [facility]

## 2019-10-31 NOTE — NURSING NOTE
Patient presents to the clinic for establish care.  Patient would also like to have her skin checked-concerned about possible skin cancer.    Chief Complaint   Patient presents with     Establish Care     Derm Problem       Initial /70 (BP Location: Right arm, Patient Position: Sitting, Cuff Size: Adult Regular)   Pulse 80   Temp 97.8  F (36.6  C) (Tympanic)   Resp 18   Ht 1.524 m (5')   Wt 91.9 kg (202 lb 8 oz)   LMP 10/24/2019 (Approximate)   BMI 39.55 kg/m   Estimated body mass index is 39.55 kg/m  as calculated from the following:    Height as of this encounter: 1.524 m (5').    Weight as of this encounter: 91.9 kg (202 lb 8 oz).  Medication Reconciliation: complete    Laura Real LPN

## 2019-11-02 ASSESSMENT — ANXIETY QUESTIONNAIRES: GAD7 TOTAL SCORE: 0

## 2019-11-04 ENCOUNTER — TELEPHONE (OUTPATIENT)
Dept: INTERNAL MEDICINE | Facility: OTHER | Age: 34
End: 2019-11-04

## 2019-11-04 DIAGNOSIS — J84.9 INTERSTITIAL LUNG DISEASE (H): Primary | ICD-10-CM

## 2019-11-04 NOTE — TELEPHONE ENCOUNTER
[facility] faxed over a request to change Spiriva Handihaler per insurance.  No alternatives listed.  [facility] pharmacy reports possible Incruse Ellpita as a possible suggestion but is unsure until they get a prescription.  [facility] [employer] offers community care only if patient qualifies, patient may need to contact  for coupons/assistance program.      Laura Real LPN 11/4/2019 12:45 PM

## 2019-11-12 ENCOUNTER — TELEPHONE (OUTPATIENT)
Dept: INTERNAL MEDICINE | Facility: OTHER | Age: 34
End: 2019-11-12

## 2019-11-12 DIAGNOSIS — J84.9 INTERSTITIAL LUNG DISEASE (H): Primary | ICD-10-CM

## 2019-11-12 RX ORDER — FLUTICASONE PROPIONATE AND SALMETEROL 113; 14 UG/1; UG/1
1 POWDER, METERED RESPIRATORY (INHALATION) DAILY
Qty: 1 INHALER | Refills: 11 | Status: SHIPPED | OUTPATIENT
Start: 2019-11-12 | End: 2022-05-12

## 2019-11-12 RX ORDER — FLUTICASONE PROPIONATE AND SALMETEROL 232; 14 UG/1; UG/1
1 POWDER, METERED RESPIRATORY (INHALATION) DAILY
Qty: 1 INHALER | Status: CANCELLED | OUTPATIENT
Start: 2019-11-12

## 2019-11-12 RX ORDER — FLUTICASONE PROPIONATE AND SALMETEROL 55; 14 UG/1; UG/1
1 POWDER, METERED RESPIRATORY (INHALATION) DAILY
Qty: 1 INHALER | Status: CANCELLED | OUTPATIENT
Start: 2019-11-12

## 2019-11-12 NOTE — TELEPHONE ENCOUNTER
Incoming fax from [facility] request to change Incruse Ellipta to something more afford-Airduo suggested from Pharmacist.      Laura Real LPN 11/12/2019 3:25 PM

## 2019-11-19 ENCOUNTER — TRANSFERRED RECORDS (OUTPATIENT)
Dept: HEALTH INFORMATION MANAGEMENT | Facility: OTHER | Age: 34
End: 2019-11-19

## 2020-01-16 ENCOUNTER — TRANSFERRED RECORDS (OUTPATIENT)
Dept: HEALTH INFORMATION MANAGEMENT | Facility: OTHER | Age: 35
End: 2020-01-16

## 2020-01-30 ENCOUNTER — TRANSFERRED RECORDS (OUTPATIENT)
Dept: HEALTH INFORMATION MANAGEMENT | Facility: OTHER | Age: 35
End: 2020-01-30

## 2020-02-03 PROBLEM — G47.33 MILD OBSTRUCTIVE SLEEP APNEA: Status: ACTIVE | Noted: 2020-02-03

## 2020-02-12 DIAGNOSIS — F41.0 PANIC DISORDER: ICD-10-CM

## 2020-02-12 RX ORDER — CITALOPRAM HYDROBROMIDE 10 MG/1
TABLET ORAL
Qty: 90 TABLET | Refills: 2 | Status: SHIPPED | OUTPATIENT
Start: 2020-02-12 | End: 2020-10-07

## 2020-02-12 NOTE — TELEPHONE ENCOUNTER
"Requested Prescriptions   Pending Prescriptions Disp Refills     citalopram (CELEXA) 10 MG tablet [Pharmacy Med Name: Citalopram Hydrobromide 10 MG Oral Tablet]  0     Sig: TAKE 1 TABLET BY MOUTH ONCE DAILY       SSRIs Protocol Passed - 2/12/2020  1:32 PM        Passed - Recent (12 mo) or future (30 days) visit within the authorizing provider's specialty     Patient has had an office visit with the authorizing provider or a provider within the authorizing providers department within the previous 12 mos or has a future within next 30 days. See \"Patient Info\" tab in inbasket, or \"Choose Columns\" in Meds & Orders section of the refill encounter.              Passed - Medication is active on med list        Passed - Patient is age 18 or older        Passed - No active pregnancy on record        Passed - No positive pregnancy test in last 12 months      LOV 10/31/19  Prescription approved per [facility] Ref[place] Protocol.  Brenda J. Goodell, RN on 2/12/2020 at 1:37 PM      "

## 2020-03-05 ENCOUNTER — TRANSFERRED RECORDS (OUTPATIENT)
Dept: HEALTH INFORMATION MANAGEMENT | Facility: OTHER | Age: 35
End: 2020-03-05

## 2020-04-27 ENCOUNTER — TRANSFERRED RECORDS (OUTPATIENT)
Dept: HEALTH INFORMATION MANAGEMENT | Facility: OTHER | Age: 35
End: 2020-04-27

## 2020-05-28 ENCOUNTER — TRANSFERRED RECORDS (OUTPATIENT)
Dept: HEALTH INFORMATION MANAGEMENT | Facility: OTHER | Age: 35
End: 2020-05-28

## 2020-07-31 ENCOUNTER — TRANSFERRED RECORDS (OUTPATIENT)
Dept: HEALTH INFORMATION MANAGEMENT | Facility: OTHER | Age: 35
End: 2020-07-31

## 2020-09-02 ENCOUNTER — TRANSFERRED RECORDS (OUTPATIENT)
Dept: HEALTH INFORMATION MANAGEMENT | Facility: OTHER | Age: 35
End: 2020-09-02

## 2020-10-07 DIAGNOSIS — F41.0 PANIC DISORDER: ICD-10-CM

## 2020-10-07 RX ORDER — CITALOPRAM HYDROBROMIDE 10 MG/1
TABLET ORAL
Qty: 90 TABLET | Refills: 3 | Status: SHIPPED | OUTPATIENT
Start: 2020-10-07 | End: 2021-10-21

## 2020-10-07 NOTE — TELEPHONE ENCOUNTER
[facility] sent Rx request for the following:   citalopram (CELEXA) 10 MG tablet  Sig: TAKE 1 TABLET BY MOUTH ONCE DAILY    Last Prescription Date:   02/12/2020  Last Fill Qty/Refills:         90, R-2    Last Office Visit:              10/31/2019   Future Office visit:           none  SSRIs Protocol Passed    Prescription refilled per RN Medication Refill Policy.................... Mi Ferguson RN ....................  10/7/2020   3:30 PM

## 2020-10-26 ENCOUNTER — MYC MEDICAL ADVICE (OUTPATIENT)
Dept: INTERNAL MEDICINE | Facility: OTHER | Age: 35
End: 2020-10-26

## 2020-10-26 ENCOUNTER — ALLIED HEALTH/NURSE VISIT (OUTPATIENT)
Dept: FAMILY MEDICINE | Facility: OTHER | Age: 35
End: 2020-10-26
Attending: FAMILY MEDICINE
Payer: COMMERCIAL

## 2020-10-26 DIAGNOSIS — R05.9 COUGH: Primary | ICD-10-CM

## 2020-10-26 PROCEDURE — C9803 HOPD COVID-19 SPEC COLLECT: HCPCS

## 2020-10-26 PROCEDURE — 99207 PR NO CHARGE NURSE ONLY: CPT

## 2020-10-26 PROCEDURE — U0003 INFECTIOUS AGENT DETECTION BY NUCLEIC ACID (DNA OR RNA); SEVERE ACUTE RESPIRATORY SYNDROME CORONAVIRUS 2 (SARS-COV-2) (CORONAVIRUS DISEASE [COVID-19]), AMPLIFIED PROBE TECHNIQUE, MAKING USE OF HIGH THROUGHPUT TECHNOLOGIES AS DESCRIBED BY CMS-2020-01-R: HCPCS | Mod: ZL | Performed by: FAMILY MEDICINE

## 2020-10-26 NOTE — TELEPHONE ENCOUNTER
She is symptomatic, could be Covid.    Have patient call [phone].. and schedule swab appointment time.     Jluis Berry MD

## 2020-10-26 NOTE — PROGRESS NOTES
Patient swabbed for COVID-19 testing.  For sore throat, cough and fatigue.  Joycelyn Cota LPN on 10/26/2020 at 9:00 AM

## 2020-10-27 LAB
SARS-COV-2 RNA SPEC QL NAA+PROBE: NOT DETECTED
SPECIMEN SOURCE: NORMAL

## 2020-11-14 DIAGNOSIS — R12 HEARTBURN: Primary | ICD-10-CM

## 2020-11-14 DIAGNOSIS — J84.9 INTERSTITIAL LUNG DISEASE (H): ICD-10-CM

## 2020-11-14 NOTE — LETTER
November 17, 2020      Maria G Haley  [address] [facility] [address]        Dear Maria G,     A refill request was received from your pharmacy for Omeprazole.    Additional refills require an office visit with Dr. Berry for annual review.    Please call [phone] to schedule appointment.          Sincerely,      Refill Nurse

## 2020-11-17 ENCOUNTER — MYC MEDICAL ADVICE (OUTPATIENT)
Dept: INTERNAL MEDICINE | Facility: OTHER | Age: 35
End: 2020-11-17

## 2020-11-17 RX ORDER — TIOTROPIUM BROMIDE 18 UG/1
CAPSULE ORAL; RESPIRATORY (INHALATION)
Qty: 30 CAPSULE | Refills: 0 | OUTPATIENT
Start: 2020-11-17

## 2020-11-17 RX ORDER — OMEPRAZOLE 40 MG/1
40 CAPSULE, DELAYED RELEASE ORAL DAILY
Qty: 90 CAPSULE | Refills: 3 | Status: SHIPPED | OUTPATIENT
Start: 2020-11-17 | End: 2022-02-07

## 2020-11-17 NOTE — TELEPHONE ENCOUNTER
I sent in an omeprazole prescription for 3 months with 3 refills on 11/17 to [facility] pharmacy.    Jluis Berry MD

## 2020-11-17 NOTE — TELEPHONE ENCOUNTER
Routing refill request to provider for review/approval because:  Drug not active on patient's medication list  Medication is reported/historical  Patient needs to be seen because it has been more than 1 year since last office visit.    LOV: 10/31/2019  Patient is due for annual review   Letter and my chart message sent.    Virginia Majano RN on 11/17/2020 at 9:29 AM

## 2020-12-27 ENCOUNTER — HEALTH MAINTENANCE LETTER (OUTPATIENT)
Age: 35
End: 2020-12-27

## 2021-01-12 ENCOUNTER — TRANSFERRED RECORDS (OUTPATIENT)
Dept: HEALTH INFORMATION MANAGEMENT | Facility: OTHER | Age: 36
End: 2021-01-12

## 2021-02-19 ENCOUNTER — TRANSFERRED RECORDS (OUTPATIENT)
Dept: HEALTH INFORMATION MANAGEMENT | Facility: OTHER | Age: 36
End: 2021-02-19

## 2021-02-23 ENCOUNTER — MYC MEDICAL ADVICE (OUTPATIENT)
Dept: INTERNAL MEDICINE | Facility: OTHER | Age: 36
End: 2021-02-23

## 2021-02-23 NOTE — TELEPHONE ENCOUNTER
Keep him masked at home if able and try to maintain social distancing as much as possible.   Hand washing.   Have everyone wear their masks at home.     They recommend waiting about 7 days after an exposure, for everyone exposed to get tested.     Would probably have everyone at home quarantine for 10-14 days...   Hopefully no one else gets it... but can take 5 to 12 days before onset of symptoms.     Jluis Berry MD

## 2021-02-27 ENCOUNTER — ALLIED HEALTH/NURSE VISIT (OUTPATIENT)
Dept: FAMILY MEDICINE | Facility: OTHER | Age: 36
End: 2021-02-27
Attending: FAMILY MEDICINE
Payer: COMMERCIAL

## 2021-02-27 DIAGNOSIS — R05.9 COUGH: Primary | ICD-10-CM

## 2021-02-27 PROCEDURE — 87635 SARS-COV-2 COVID-19 AMP PRB: CPT | Mod: ZL | Performed by: FAMILY MEDICINE

## 2021-02-27 PROCEDURE — C9803 HOPD COVID-19 SPEC COLLECT: HCPCS

## 2021-02-28 LAB
LABORATORY COMMENT REPORT: NORMAL
SARS-COV-2 RNA RESP QL NAA+PROBE: NEGATIVE
SARS-COV-2 RNA RESP QL NAA+PROBE: NORMAL
SPECIMEN SOURCE: NORMAL
SPECIMEN SOURCE: NORMAL

## 2021-04-25 ENCOUNTER — HEALTH MAINTENANCE LETTER (OUTPATIENT)
Age: 36
End: 2021-04-25

## 2021-06-15 ENCOUNTER — TRANSFERRED RECORDS (OUTPATIENT)
Dept: HEALTH INFORMATION MANAGEMENT | Facility: OTHER | Age: 36
End: 2021-06-15

## 2021-10-09 ENCOUNTER — HEALTH MAINTENANCE LETTER (OUTPATIENT)
Age: 36
End: 2021-10-09

## 2021-10-19 DIAGNOSIS — F41.0 PANIC DISORDER: ICD-10-CM

## 2021-10-19 NOTE — LETTER
October 21, 2021      Maria G Haley  [address] [facility] [address]        Dear Maria G,         A refill of citalopram (CELEXA) 10 MG tablet  has been requested by your pharmacy.  We noticed that it has been greater than 12 months since your last comprehensive visit and labs with Jluis Berry MD.  A limited 90 day supply has been sent to your pharmacy at this time.    Additional refills require a medication management appointment.  Your health is very important to us.  Please call the clinic at [phone] to schedule your appointment.    Thank you,    The Refill Nurse  [facility]

## 2021-10-21 RX ORDER — CITALOPRAM HYDROBROMIDE 10 MG/1
TABLET ORAL
Qty: 90 TABLET | Refills: 0 | Status: SHIPPED | OUTPATIENT
Start: 2021-10-21 | End: 2022-03-31

## 2021-10-21 NOTE — TELEPHONE ENCOUNTER
"[facility] sent Rx request for the following:   citalopram (CELEXA) 10 MG tablet   Sig Take 1 tablet by mouth once daily    Last Prescription Date:   10/07/2020  Last Fill Qty/Refills:         90, R-3    Last Office Visit:              10/31/2019 (Kristi)   Future Office visit:           None noted   SSRIs Protocol Failed - 10/19/2021  7:53 AM        Failed - Recent (12 mo) or future (30 days) visit within the authorizing provider's specialty     Patient has had an office visit with the authorizing provider or a provider within the authorizing providers department within the previous 12 mos or has a future within next 30 days. See \"Patient Info\" tab in inbasket, or \"Choose Columns\" in Meds & Orders section of the refill encounter.           Letter sent via [employer] regarding appointment need. Prescription approved per [facility] Refill Protocol for 90 day rosemarie refill. Anh Madden RN ....................  10/21/2021   10:20 AM      "

## 2021-12-06 ENCOUNTER — OFFICE VISIT (OUTPATIENT)
Dept: FAMILY MEDICINE | Facility: OTHER | Age: 36
End: 2021-12-06
Attending: FAMILY MEDICINE
Payer: COMMERCIAL

## 2021-12-06 VITALS
OXYGEN SATURATION: 97 % | TEMPERATURE: 98.7 F | RESPIRATION RATE: 16 BRPM | DIASTOLIC BLOOD PRESSURE: 60 MMHG | WEIGHT: 198.6 LBS | SYSTOLIC BLOOD PRESSURE: 102 MMHG | BODY MASS INDEX: 38.79 KG/M2 | HEART RATE: 70 BPM

## 2021-12-06 DIAGNOSIS — F41.9 ANXIETY: ICD-10-CM

## 2021-12-06 DIAGNOSIS — R11.2 NAUSEA AND VOMITING, INTRACTABILITY OF VOMITING NOT SPECIFIED, UNSPECIFIED VOMITING TYPE: Primary | ICD-10-CM

## 2021-12-06 PROCEDURE — C9803 HOPD COVID-19 SPEC COLLECT: HCPCS

## 2021-12-06 PROCEDURE — 99213 OFFICE O/P EST LOW 20 MIN: CPT | Performed by: FAMILY MEDICINE

## 2021-12-06 PROCEDURE — 250N000011 HC RX IP 250 OP 636: Performed by: FAMILY MEDICINE

## 2021-12-06 RX ORDER — ONDANSETRON 4 MG/1
4 TABLET, ORALLY DISINTEGRATING ORAL EVERY 6 HOURS PRN
Status: ACTIVE | OUTPATIENT
Start: 2021-12-06

## 2021-12-06 RX ORDER — ONDANSETRON 4 MG/1
4 TABLET, ORALLY DISINTEGRATING ORAL EVERY 8 HOURS PRN
Qty: 30 TABLET | Refills: 3 | Status: SHIPPED | OUTPATIENT
Start: 2021-12-06

## 2021-12-06 RX ORDER — HYDROXYZINE PAMOATE 50 MG/1
50 CAPSULE ORAL EVERY 6 HOURS PRN
Qty: 30 CAPSULE | Refills: 3 | Status: SHIPPED | OUTPATIENT
Start: 2021-12-06 | End: 2022-04-05

## 2021-12-06 RX ADMIN — ONDANSETRON 4 MG: 4 TABLET, ORALLY DISINTEGRATING ORAL at 11:27

## 2021-12-06 ASSESSMENT — PAIN SCALES - GENERAL: PAINLEVEL: MILD PAIN (3)

## 2021-12-06 ASSESSMENT — ENCOUNTER SYMPTOMS
FEVER: 1
SHORTNESS OF BREATH: 0
DIARRHEA: 1
COUGH: 0
RHINORRHEA: 0
VOMITING: 1
NAUSEA: 1

## 2021-12-06 NOTE — NURSING NOTE
Chief Complaint   Patient presents with     Sick     Here today with complaints of feeling increased fatigue and poor appetite since COVID booster on 12/1.     Medication Reconciliation: complete    Lexi Oreilly LPN

## 2021-12-06 NOTE — PROGRESS NOTES
SUBJECTIVE:   Nursing Notes:   Lexi Oreilly LPN  12/6/2021 11:04 AM  Sign at exiting of workspace  Chief Complaint   Patient presents with     Sick     Here today with complaints of feeling increased fatigue and poor appetite since COVID booster on 12/1.     Medication Reconciliation: complete    Lexi Oreilly LPN        Maria G Haley is a 36 year old female who presents to clinic today for a complaint of of fatigue and poor appetite.  She had her covid booster on 12/1/2021.  Has had vomiting as well.  Last emesis was this morning.  She has been vomiting 5-8 times per day.  She had woke up in the middle of the night after having her covid booster.  In the morning, she realized she had a fever to 103.  Her fever lasted a couple of days.  No fever for the past couple of days. She is feeling nauseous and very tired.  She has been trying to keep drinking.  She has been able to tolerate some coca-cola and tea.  She has still been urinating some, but not as much as normal.  Thinks she has still been urinating at least 3 times in 24 hours.  No headache or sore throat.  No cough or rhinorrhea.  No loss of sense of taste or smell.   She gets reactions like this with the flu shot, but this is worse than usual.  She has Interstitial Lung Disease and Polymyositis and is on chronic prednisone.  She has had a little diarrhea as well since her symptoms started.    HPI    I personally reviewed medications/allergies/history listed below:    Patient Active Problem List    Diagnosis Date Noted     Mild obstructive sleep apnea - Sleep Study 1/16/2020 - [facility] 02/03/2020     Priority: Medium     Immunocompromised state (H) 10/31/2019     Priority: Medium     Large hiatal hernia 10/29/2019     Priority: Medium     Polymyositis (H) 01/24/2017     Priority: Medium     Interstitial lung disease (H) 01/20/2017     Priority: Medium     Severe episode of recurrent major depressive disorder, without psychotic features (H)  2016     Priority: Medium     Mixed hyperlipidemia 2016     Priority: Medium     Obesity (BMI 30.0-34.9) 2016     Priority: Medium     Overview:   BMI 33.54 2017       Panic disorder 2016     Priority: Medium     Fibromyalgia muscle pain 2013     Priority: Medium     Constipation, chronic 2012     Priority: Medium     Heartburn 09/15/2011     Priority: Medium     Temporomandibular joint disorder 2011     Priority: Medium     Overview:   bilateral       Past Medical History:   Diagnosis Date     Calculus of kidney      2008,R kidney stone ct     Developmental disorder of scholastic skills     /depression ?ADHD     Dysplastic nevus 2011    Overview:  left leg     Encounter for immunization          Gastro-esophageal reflux disease without esophagitis     No Comments Provided     History of nephrolithiasis 2008     Interstitial lung disease (H) 2017     Major depressive disorder, recurrent, mild (H)          Nicotine dependence, uncomplicated     2011,Using nicoderm patch     Polymyositis (H) 2017      Past Surgical History:   Procedure Laterality Date     TONSILLECTOMY, ADENOIDECTOMY, COMBINED      age 5     TYMPANOSTOMY, LOCAL/TOPICAL ANESTHESIA      age 5,PC tubes     Family History   Problem Relation Age of Onset     Cancer Maternal Grandmother         Cancer,lung cancer     Diabetes Other         Diabetes     Asthma Sister         Asthma     Social History     Tobacco Use     Smoking status: Former Smoker     Packs/day: 0.50     Types: Cigarettes     Quit date: 2013     Years since quittin.6     Smokeless tobacco: Never Used     Tobacco comment: Quit smoking: information provided on NRT   Substance Use Topics     Alcohol use: No     Alcohol/week: 0.0 standard drinks     Social History     Social History Narrative     - [employer]    2 children.     Had a , closed summer 2019 due to medical illness/fatigue     Current  Outpatient Medications   Medication Sig Dispense Refill     ALPRAZolam (XANAX) 0.25 MG tablet Take 0.25 mg by mouth 3 times daily as needed       B Complex-Biotin-FA (SUPER B-COMPLEX) TABS Take 1 tablet by mouth daily       Calcium-Magnesium 250-125 MG TABS Take 1 tablet by mouth daily       cholecalciferol (VITAMIN D3) 1000 units (25 mcg) capsule Take 1 capsule (1,000 Units) by mouth daily       citalopram (CELEXA) 10 MG tablet Take 1 tablet by mouth once daily 90 tablet 0     fluticasone-salmeterol (AIRDUO RESPICLICK) 113-14 MCG/ACT inhaler Inhale 1 puff into the lungs daily Rinse mouth well after use to prevent Thrush 1 Inhaler 11     hydrOXYzine (VISTARIL) 50 MG capsule Take 1 capsule (50 mg) by mouth every 6 hours as needed for anxiety 30 capsule 3     ibuprofen (ADVIL/MOTRIN) 800 MG tablet Take 800 mg by mouth every 8 hours as needed       metaxalone (SKELAXIN) 800 MG tablet Take 0.5-1 tablets (400-800 mg) by mouth 4 times daily as needed for muscle soreness or moderate pain 30 tablet 11     mycophenolate (GENERIC EQUIVALENT) 500 MG tablet 1500 mg BID po       omeprazole (PRILOSEC) 40 MG DR capsule Take 1 capsule (40 mg) by mouth daily 90 capsule 3     ondansetron (ZOFRAN-ODT) 4 MG ODT tab Take 1 tablet (4 mg) by mouth every 8 hours as needed for nausea 30 tablet 3     predniSONE (DELTASONE) 10 MG tablet Take 10 mg by mouth daily       sucralfate (CARAFATE) 1 GM tablet Take 1 tablet (1 g) by mouth 4 times daily as needed (heartburn) 120 tablet 11     tiZANidine (ZANAFLEX) 2 MG tablet Take 1-2 tablets (2-4 mg) by mouth 3 times daily as needed for muscle spasms (-- no driving after use) 30 tablet 3     Allergies   Allergen Reactions     Nitrofurantoin Shortness Of Breath     Hydrocodone-Acetaminophen Nausea     Sertraline Anxiety     Increased patient's anxiety       Review of Systems   Constitutional: Positive for fever.   HENT: Negative for congestion and rhinorrhea.    Respiratory: Negative for cough and  shortness of breath.    Gastrointestinal: Positive for diarrhea, nausea and vomiting.        OBJECTIVE:     /60 (BP Location: Right arm, Patient Position: Sitting, Cuff Size: Adult Regular)   Pulse 70   Temp 98.7  F (37.1  C) (Tympanic)   Resp 16   Wt 90.1 kg (198 lb 9.6 oz)   LMP 11/29/2021 (Approximate)   SpO2 97%   Breastfeeding No   BMI 38.79 kg/m    Body mass index is 38.79 kg/m .  Physical Exam  Constitutional:       Appearance: Normal appearance.   HENT:      Head: Normocephalic.      Right Ear: Tympanic membrane normal.      Left Ear: Tympanic membrane normal.      Nose: Nose normal. No congestion or rhinorrhea.      Mouth/Throat:      Mouth: Mucous membranes are moist.      Pharynx: Oropharynx is clear. No oropharyngeal exudate or posterior oropharyngeal erythema.   Eyes:      Extraocular Movements: Extraocular movements intact.      Pupils: Pupils are equal, round, and reactive to light.   Cardiovascular:      Rate and Rhythm: Normal rate and regular rhythm.      Heart sounds: Normal heart sounds. No murmur heard.      Pulmonary:      Effort: Pulmonary effort is normal.      Breath sounds: Normal breath sounds. No wheezing, rhonchi or rales.   Musculoskeletal:      Cervical back: Normal range of motion and neck supple.      Right lower leg: No edema.      Left lower leg: No edema.   Lymphadenopathy:      Cervical: No cervical adenopathy.   Neurological:      Mental Status: She is alert.   Psychiatric:         Mood and Affect: Mood normal.         Behavior: Behavior normal.           PHQ-9 SCORE 5/9/2017 12/18/2018 10/31/2019   PHQ-9 Total Score 4 0 0       PHQ-2 Score:     PHQ-2 ( 1999 [employer]) 12/18/2018   Q1: Little interest or pleasure in doing things 0   Q2: Feeling down, depressed or hopeless 0   PHQ-2 Score 0   PHQ-2 Total Score (12-17 Years)- Positive if 3 or more points; Administer PHQ-A if positive 0       VICKY-7 SCORE 5/9/2017 12/18/2018 10/31/2019   Total Score 7 0 0         No  flowsheet data found.      I personally reviewed results withpatient as listed below:   Diagnostic Test Results:  none     ASSESSMENT/PLAN:       ICD-10-CM    1. Nausea and vomiting, intractability of vomiting not specified, unspecified vomiting type  R11.2 ondansetron (ZOFRAN-ODT) 4 MG ODT tab     ondansetron (ZOFRAN-ODT) ODT tab 4 mg   2. Anxiety  F41.9 hydrOXYzine (VISTARIL) 50 MG capsule       1.  Discussed differential diagnosis could include side effects from vaccine vs covid-19 vs other viral illness.  Offered to retest for covid, which she declined.  She declines other lab work as well.  She is not tachycardia and has moist mucous membranes, so feel her dehydration is mild at this time.  Recommend anti-emetic and pushing oral fluids.  Discussed follow up if worsening dehydration.  2.  She has had some panic symptoms lately as well and requested an as needed medication to take for this.  She used to take klonopin, but requested a medication that is not addictive.  Prescription for hydroxyzine provided.  She may take 50 mg four times daily as needed for anxiety.    Yazmin Guzmán MD  [facility]

## 2021-12-07 ENCOUNTER — MYC MEDICAL ADVICE (OUTPATIENT)
Dept: INTERNAL MEDICINE | Facility: OTHER | Age: 36
End: 2021-12-07
Payer: COMMERCIAL

## 2021-12-15 ENCOUNTER — TRANSFERRED RECORDS (OUTPATIENT)
Dept: HEALTH INFORMATION MANAGEMENT | Facility: CLINIC | Age: 36
End: 2021-12-15
Payer: COMMERCIAL

## 2021-12-27 NOTE — PROGRESS NOTES
[facility] AND [facility]  [address]  [place] [address]  Phone: [phone]  Fax: [phone]  Primary Provider: Jluis Berry  Pre-op Performing Provider: RUBI GRACIA      PREOPERATIVE EVALUATION:  Today's date: 12/29/2021    Maria G Haley is a 36 year old female who presents for a preoperative evaluation.    Surgical Information:  Surgery/Procedure: Right then Left carpal tunnel repair   Surgery Location: [facility]  Surgeon: Beatriz  Surgery Date: 1/3 and 1/17/22  Time of Surgery: TBD  Where patient plans to recover: At home with family  Fax number for surgical facility:     Type of Anesthesia Anticipated: General    Assessment & Plan     The proposed surgical procedure is considered INTERMEDIATE risk.    Problem List Items Addressed This Visit        Respiratory    Interstitial lung disease (H)    Relevant Medications    predniSONE (DELTASONE) 2.5 MG tablet    Other Relevant Orders    Pregnancy, Urine (HCG) (Completed)    Hemoglobin (Completed)       Behavioral    Severe episode of recurrent major depressive disorder, without psychotic features (H)      Other Visit Diagnoses     Preop general physical exam    -  Primary    Relevant Orders    Pregnancy, Urine (HCG) (Completed)    Hemoglobin (Completed)    Bilateral carpal tunnel syndrome            Completed pregnancy test and hemoglobin for preoperative clearance.  Pregnancy test is negative.  Patient's hemoglobin is mildly decreased at 9.7.  Patient recently ended her menstrual cycle and had heavier bleeding.  Hemoglobin was in the stable range 2 months ago.  Has iron tablets at home.  Encouraged to take iron twice daily over the next week prior to her surgery to improve her hemoglobin level.  Encouraged increased dietary intake of iron-containing foods.    COVID-19 testing will be completed the morning of the procedure.    Patient was encouraged to discuss her prednisone 2.5 mg daily dose with her rheumatologist to see if it is  okay to discontinue the medication.  Patient has history of polymyositis.  Okay to continue taking the medication after the procedure if this is unable to be stopped.    Risks and Recommendations:  The patient has the following additional risks and recommendations for perioperative complications:   - No identified additional risk factors other than previously addressed    Medication Instructions:  Patient Instructions     Patient should take the following medications the morning of surgery with a small sip of water: hold all meds.  Patient was instructed to hold the following medications the morning of surgery: hold all other medications.     Patient was advised to call our office and the surgical services with any change in condition or new symptoms if they were to develop between today and their surgical date.  Especially any cardiopulmonary symptoms or symptoms concerning for an infection.     Discontinue aspirin, aleve, naproxen and ibuprofen 7 days prior to surgery to reduce bleeding risk.  It is fine to take tylenol the week before surgery.  Hold vitamins and herbal remedies for 7 days before surgery.    Please have a curbside COVID-19 test on 12/30/2021 and 1/13/2022 if needed.   Please stay in your car and call [phone] when you arrive.      Preparing for Your Surgery  Getting started  A nurse will call you to review your health history and instructions. They will give you an arrival time based on your scheduled surgery time. Please be ready to share:    Your doctor's clinic name and phone number    Your medical, surgical and anesthesia history    A list of allergies and sensitivities    A list of medicines, including herbal treatments and over-the-counter drugs    Whether the patient has a legal guardian (ask how to send us the papers in advance)  Please tell us if you're pregnant--or if there's any chance you might be pregnant. Some surgeries may injure a fetus (unborn baby), so they require a  pregnancy test. Surgeries that are safe for a fetus don't always need a test, and you can choose whether to have one.   If you have a child who's having surgery, please ask for a copy of Preparing for Your Child's Surgery.    Preparing for surgery    Within 30 days of surgery: Have a pre-op exam (sometimes called an H&P, or History and Physical). This can be done at a clinic or pre-operative center.  ? If you're having a , you may not need this exam. Talk to your care team.    At your pre-op exam, talk to your care team about all medicines you take. If you need to stop any medicines before surgery, ask when to start taking them again.  ? We do this for your safety. Many medicines can make you bleed too much during surgery. Some change how well surgery (anesthesia) drugs work.    Call your insurance company to let them know you're having surgery. (If you don't have insurance, call [phone].)    Call your clinic if there's any change in your health. This includes signs of a cold or flu (sore throat, runny nose, cough, rash, fever). It also includes a scrape or scratch near the surgery site.    If you have questions on the day of surgery, call your hospital or surgery center.  COVID testing  You may need to be tested for COVID-19 before having surgery. If so, we will give you instructions.  Eating and drinking guidelines  For your safety: Unless your surgeon tells you otherwise, follow the guidelines below.    Eat and drink as usual until 8 hours before surgery. After that, no food or milk.    Drink clear liquids until 2 hours before surgery. These are liquids you can see through, like water, Gatorade and Propel Water. You may also have black coffee and tea (no cream or milk).    Nothing by mouth within 2 hours of surgery. This includes gum, candy and breath mints.    If you drink alcohol: Stop drinking it the night before surgery.    If your care team tells you to take medicine on the morning of surgery,  it's okay to take it with a sip of water.  Preventing infection    Shower or bathe the night before and morning of your surgery. Follow the instructions your clinic gave you. (If no instructions, use regular soap.)    Don't shave or clip hair near your surgery site. We'll remove the hair if needed.    Don't smoke or vape the morning of surgery. You may chew nicotine gum up to 2 hours before surgery. A nicotine patch is okay.  ? Note: Some surgeries require you to completely quit smoking and nicotine. Check with your surgeon.    Your care team will make every effort to keep you safe from infection. We will:  ? Clean our hands often with soap and water (or an alcohol-based hand rub).  ? Clean the skin at your surgery site with a special soap that kills germs.  ? Give you a special gown to keep you warm. (Cold raises the risk of infection.)  ? Wear special hair covers, masks, gowns and gloves during surgery.  ? Give antibiotic medicine, if prescribed. Not all surgeries need antibiotics.  What to bring on the day of surgery    Photo ID and insurance card    Copy of your health care directive, if you have one    Glasses and hearing aides (bring cases)  ? You can't wear contacts during surgery    Inhaler and eye drops, if you use them (tell us about these when you arrive)    CPAP machine or breathing device, if you use them    A few personal items, if spending the night    If you have . . .  ? A pacemaker, ICD (cardiac defibrillator) or other implant: Bring the ID card.  ? An implanted stimulator: Bring the remote control.  ? A legal guardian: Bring a copy of the certified (court-stamped) guardianship papers.  Please remove any jewelry, including body piercings. Leave jewelry and other valuables at home.  If you're going home the day of surgery    You must have a responsible adult drive you home. They should stay with you overnight as well.    If you don't have someone to stay with you, and you aren't safe to go home  alone, we may keep you overnight. Insurance often won't pay for this.  After surgery  If it's hard to control your pain or you need more pain medicine, please call your surgeon's office.  Questions?   If you have any questions for your care team, list them here: _________________________________________________________________________________________________________________________________________________________________________ ____________________________________ ____________________________________ ____________________________________  For informational purposes only. Not to replace the advice of your health care provider. Copyright   2019 Health system. All rights reserved. Clinically reviewed by Dolores Zambrano MD. [employer] [id number] - REV .    Preparing for Your Surgery  Getting started  A nurse will call you to review your health history and instructions. They will give you an arrival time based on your scheduled surgery time. Please be ready to share:    Your doctor's clinic name and phone number    Your medical, surgical and anesthesia history    A list of allergies and sensitivities    A list of medicines, including herbal treatments and over-the-counter drugs    Whether the patient has a legal guardian (ask how to send us the papers in advance)  Please tell us if you're pregnant--or if there's any chance you might be pregnant. Some surgeries may injure a fetus (unborn baby), so they require a pregnancy test. Surgeries that are safe for a fetus don't always need a test, and you can choose whether to have one.   If you have a child who's having surgery, please ask for a copy of Preparing for Your Child's Surgery.    Preparing for surgery    Within 30 days of surgery: Have a pre-op exam (sometimes called an H&P, or History and Physical). This can be done at a clinic or pre-operative center.  ? If you're having a , you may not need this exam. Talk to your care team.    At your pre-op  exam, talk to your care team about all medicines you take. If you need to stop any medicines before surgery, ask when to start taking them again.  ? We do this for your safety. Many medicines can make you bleed too much during surgery. Some change how well surgery (anesthesia) drugs work.    Call your insurance company to let them know you're having surgery. (If you don't have insurance, call [phone].)    Call your clinic if there's any change in your health. This includes signs of a cold or flu (sore throat, runny nose, cough, rash, fever). It also includes a scrape or scratch near the surgery site.    If you have questions on the day of surgery, call your hospital or surgery center.  COVID testing  You may need to be tested for COVID-19 before having surgery. If so, we will give you instructions.  Eating and drinking guidelines  For your safety: Unless your surgeon tells you otherwise, follow the guidelines below.    Eat and drink as usual until 8 hours before surgery. After that, no food or milk.    Drink clear liquids until 2 hours before surgery. These are liquids you can see through, like water, Gatorade and Propel Water. You may also have black coffee and tea (no cream or milk).    Nothing by mouth within 2 hours of surgery. This includes gum, candy and breath mints.    If you drink alcohol: Stop drinking it the night before surgery.    If your care team tells you to take medicine on the morning of surgery, it's okay to take it with a sip of water.  Preventing infection    Shower or bathe the night before and morning of your surgery. Follow the instructions your clinic gave you. (If no instructions, use regular soap.)    Don't shave or clip hair near your surgery site. We'll remove the hair if needed.    Don't smoke or vape the morning of surgery. You may chew nicotine gum up to 2 hours before surgery. A nicotine patch is okay.  ? Note: Some surgeries require you to completely quit smoking and nicotine.  Check with your surgeon.    Your care team will make every effort to keep you safe from infection. We will:  ? Clean our hands often with soap and water (or an alcohol-based hand rub).  ? Clean the skin at your surgery site with a special soap that kills germs.  ? Give you a special gown to keep you warm. (Cold raises the risk of infection.)  ? Wear special hair covers, masks, gowns and gloves during surgery.  ? Give antibiotic medicine, if prescribed. Not all surgeries need antibiotics.  What to bring on the day of surgery    Photo ID and insurance card    Copy of your health care directive, if you have one    Glasses and hearing aides (bring cases)  ? You can't wear contacts during surgery    Inhaler and eye drops, if you use them (tell us about these when you arrive)    CPAP machine or breathing device, if you use them    A few personal items, if spending the night    If you have . . .  ? A pacemaker, ICD (cardiac defibrillator) or other implant: Bring the ID card.  ? An implanted stimulator: Bring the remote control.  ? A legal guardian: Bring a copy of the certified (court-stamped) guardianship papers.  Please remove any jewelry, including body piercings. Leave jewelry and other valuables at home.  If you're going home the day of surgery    You must have a responsible adult drive you home. They should stay with you overnight as well.    If you don't have someone to stay with you, and you aren't safe to go home alone, we may keep you overnight. Insurance often won't pay for this.  After surgery  If it's hard to control your pain or you need more pain medicine, please call your surgeon's office.  Questions?   If you have any questions for your care team, list them here: _________________________________________________________________________________________________________________________________________________________________________ ____________________________________ ____________________________________  ____________________________________  For informational purposes only. Not to replace the advice of your health care provider. Copyright   2003, 2019 [email or web address] [facility]. All rights reserved. Clinically reviewed by Dolores Zambrano MD. [employer] [id number] - REV 07/21.       RECOMMENDATION:  APPROVAL GIVEN to proceed with proposed procedure, without further diagnostic evaluation.    30 minutes spent on the date of the encounter doing chart review, history and exam, documentation and further activities per the note        Subjective     HPI related to upcoming procedure:   Patient is history of carpal tunnel syndrome bilaterally that has been flaring since 2009.  Worse over the last couple years.  Works in cleans a lot.  Has a lot of hand pain.     Preop Questions 12/29/2021   1. Have you ever had a heart attack or stroke? No   2. Have you ever had surgery on your heart or blood vessels, such as a stent placement, a coronary artery bypass, or surgery on an artery in your head, neck, heart, or legs? No   3. Do you have chest pain with activity? No   4. Do you have a history of  heart failure? No   5. Do you currently have a cold, bronchitis or symptoms of other infection? No   6. Do you have a cough, shortness of breath, or wheezing? No   7. Do you or anyone in your family have previous history of blood clots? No   8. Do you or does anyone in your family have a serious bleeding problem such as prolonged bleeding following surgeries or cuts? No   9. Have you ever had problems with anemia or been told to take iron pills? YES - History of anemia due to autoimmune disease   10. Have you had any abnormal blood loss such as black, tarry or bloody stools, or abnormal vaginal bleeding? No   11. Have you ever had a blood transfusion? No   12. Are you willing to have a blood transfusion if it is medically needed before, during, or after your surgery? Yes   13. Have you or any of your relatives ever had problems with anesthesia? No    14. Do you have sleep apnea, excessive snoring or daytime drowsiness? YES - snoring, have a CPAP machine   14a. Do you have a CPAP machine? Yes   15. Do you have any artifical heart valves or other implanted medical devices like a pacemaker, defibrillator, or continuous glucose monitor? No   16. Do you have artificial joints? No   17. Are you allergic to latex? No   18. Is there any chance that you may be pregnant? No     Health Care Directive:  Patient does not have a Health Care Directive or Living Will: Discussed advance care planning with patient; however, patient declined at this time.    Preoperative Review of :   reviewed - no record of controlled substances prescribed.      Status of Chronic Conditions:  DEPRESSION - Patient has a long history of Depression of moderate severity requiring medication for control with recent symptoms being stable..Current symptoms of depression include none.     Interstitial lung disease: Patient is currently stable.  Previously was on CellCept.  This was stopped with COVID-19 as the patient was nervous about getting an infection while taking CellCept.  Currently has been weaning down on her prednisone.  Currently taking 2.5 mg daily.  Tolerates the medication well.  No side effects noted.  Patient gets routine frequent labs for monitoring.    Patient states that she recently finished her menstrual cycle.  Tends to have heavier menstrual cycles.  Currently feeling fine.  Hemoglobin was in the normal range 2 months ago.    Patient has tolerated surgery well in the past.  Patient has no recent cough or cold symptoms.  No recent fevers, chills, sore throat, ear pain, chest pain, palpitations, problems breathing, stomachaches, nausea, vomiting, diarrhea, constipation, blood in stool or urine, dysuria, frequency, urgency.    Patient can walk up a flight of stairs without becoming short of breath or having chest pain: YES   Patient is able to tolerate greater than 4 METs of  activity without any cardiopulmonary symptoms.    Review of Systems  CONSTITUTIONAL: NEGATIVE for fever, chills, change in weight  INTEGUMENTARY/SKIN: NEGATIVE for worrisome rashes, moles or lesions  EYES: NEGATIVE for vision changes or irritation  ENT/MOUTH: NEGATIVE for ear, mouth and throat problems  RESP: NEGATIVE for significant cough or SOB  CV: NEGATIVE for chest pain, palpitations or peripheral edema  GI: NEGATIVE for nausea, abdominal pain, heartburn, or change in bowel habits  : NEGATIVE for frequency, dysuria, or hematuria  MUSCULOSKELETAL: NEGATIVE for significant arthralgias or myalgia  NEURO: NEGATIVE for weakness, dizziness or paresthesias  ENDOCRINE: NEGATIVE for temperature intolerance, skin/hair changes  HEME: NEGATIVE for bleeding problems  PSYCHIATRIC: NEGATIVE for changes in mood or affect    Patient Active Problem List    Diagnosis Date Noted     Mild obstructive sleep apnea - Sleep Study 1/16/2020 - [facility] 02/03/2020     Priority: Medium     Immunocompromised state (H) 10/31/2019     Priority: Medium     Large hiatal hernia 10/29/2019     Priority: Medium     Polymyositis (H) 01/24/2017     Priority: Medium     Interstitial lung disease (H) 01/20/2017     Priority: Medium     Severe episode of recurrent major depressive disorder, without psychotic features (H) 12/08/2016     Priority: Medium     Mixed hyperlipidemia 02/24/2016     Priority: Medium     Obesity (BMI 30.0-34.9) 02/24/2016     Priority: Medium     Overview:   BMI 33.54 5/9/2017       Panic disorder 02/23/2016     Priority: Medium     Fibromyalgia muscle pain 06/21/2013     Priority: Medium     Constipation, chronic 01/13/2012     Priority: Medium     Heartburn 09/15/2011     Priority: Medium     Temporomandibular joint disorder 01/11/2011     Priority: Medium     Overview:   bilateral        Past Medical History:   Diagnosis Date     Calculus of kidney      8/13/2008,R kidney stone ct     Developmental disorder of scholastic  skills     /depression ?ADHD     Dysplastic nevus 1/11/2011    Overview:  left leg     Encounter for immunization     2007     Gastro-esophageal reflux disease without esophagitis     No Comments Provided     History of nephrolithiasis 8/13/2008     Interstitial lung disease (H) 1/20/2017     Major depressive disorder, recurrent, mild (H)     2008     Nicotine dependence, uncomplicated     1/11/2011,Using nicoderm patch     Polymyositis (H) 1/24/2017     Past Surgical History:   Procedure Laterality Date     TONSILLECTOMY, ADENOIDECTOMY, COMBINED      age 5     TYMPANOSTOMY, LOCAL/TOPICAL ANESTHESIA      age 5,PC tubes     Current Outpatient Medications   Medication Sig Dispense Refill     B Complex-Biotin-FA (SUPER B-COMPLEX) TABS Take 1 tablet by mouth daily       Calcium-Magnesium 250-125 MG TABS Take 1 tablet by mouth daily       cholecalciferol (VITAMIN D3) 1000 units (25 mcg) capsule Take 1 capsule (1,000 Units) by mouth daily       citalopram (CELEXA) 10 MG tablet Take 1 tablet by mouth once daily 90 tablet 0     fluticasone-salmeterol (AIRDUO RESPICLICK) 113-14 MCG/ACT inhaler Inhale 1 puff into the lungs daily Rinse mouth well after use to prevent Thrush 1 Inhaler 11     hydrOXYzine (VISTARIL) 50 MG capsule Take 1 capsule (50 mg) by mouth every 6 hours as needed for anxiety 30 capsule 3     ibuprofen (ADVIL/MOTRIN) 800 MG tablet Take 800 mg by mouth every 8 hours as needed       metaxalone (SKELAXIN) 800 MG tablet Take 0.5-1 tablets (400-800 mg) by mouth 4 times daily as needed for muscle soreness or moderate pain 30 tablet 11     omeprazole (PRILOSEC) 40 MG DR capsule Take 1 capsule (40 mg) by mouth daily 90 capsule 3     ondansetron (ZOFRAN-ODT) 4 MG ODT tab Take 1 tablet (4 mg) by mouth every 8 hours as needed for nausea 30 tablet 3     predniSONE (DELTASONE) 2.5 MG tablet Take 1 tablet (2.5 mg) by mouth daily       sucralfate (CARAFATE) 1 GM tablet Take 1 tablet (1 g) by mouth 4 times daily as needed  "(heartburn) 120 tablet 11     tiZANidine (ZANAFLEX) 2 MG tablet Take 1-2 tablets (2-4 mg) by mouth 3 times daily as needed for muscle spasms (-- no driving after use) 30 tablet 3       Allergies   Allergen Reactions     Nitrofurantoin Shortness Of Breath     Hydrocodone-Acetaminophen Nausea     Sertraline Anxiety     Increased patient's anxiety        Social History     Tobacco Use     Smoking status: Former Smoker     Packs/day: 1.00     Years: 15.00     Pack years: 15.00     Types: Cigarettes     Quit date: 2013     Years since quittin.7     Smokeless tobacco: Never Used     Tobacco comment: Quit smoking: information provided on NRT   Substance Use Topics     Alcohol use: No     Alcohol/week: 0.0 standard drinks     Family History   Problem Relation Age of Onset     Asthma Sister      Cancer Maternal Grandmother         Cancer,lung cancer     Diabetes Other         Diabetes     History   Drug Use Unknown         Objective     /72   Pulse 89   Temp 98.1  F (36.7  C) (Tympanic)   Resp 16   Ht 1.52 m (4' 11.84\")   Wt 91.6 kg (202 lb)   LMP 2021 (Approximate)   SpO2 97%   Breastfeeding No   BMI 39.66 kg/m      Physical Exam    GENERAL APPEARANCE: healthy, alert and no distress     EYES: EOMI, PERRL     HENT: ear canals and TM's normal and nose and mouth without ulcers or lesions     NECK: no adenopathy, no asymmetry, masses, or scars and thyroid normal to palpation     RESP: lungs clear to auscultation - no rales, rhonchi or wheezes     CV: regular rates and rhythm, normal S1 S2, no S3 or S4 and no murmur, click or rub     ABDOMEN:  soft, nontender, no HSM or masses and bowel sounds normal     MS: extremities normal- no gross deformities noted, no evidence of inflammation in joints, FROM in all extremities.     SKIN: no suspicious lesions or rashes     NEURO: Normal strength and tone, sensory exam grossly normal, mentation intact and speech normal     PSYCH: mentation appears normal. and " affect normal/bright     LYMPHATICS: No cervical adenopathy    No results for input(s): HGB, PLT, INR, NA, POTASSIUM, CR, A1C in the last 10954 hours.     Diagnostics:  Recent Results (from the past 168 hour(s))   Pregnancy, Urine (HCG)    Collection Time: 12/29/21  3:34 PM   Result Value Ref Range    hCG Urine Qualitative Negative Negative   Hemoglobin    Collection Time: 12/29/21  3:34 PM   Result Value Ref Range    Hemoglobin 9.7 (L) 11.7 - 15.7 g/dL        Creatinine 0.84 (Nl 0.7-1.2)      No EKG required, no history of coronary heart disease, significant arrhythmia, peripheral arterial disease or other structural heart disease.    Revised Cardiac Risk Index (RCRI):  The patient has the following serious cardiovascular risks for perioperative complications:   - No serious cardiac risks = 0 points     RCRI Interpretation: 0 points: Class I (very low risk - 0.4% complication rate)           Signed Electronically by: Bonnie Bianchi PA-C  Copy of this evaluation report is provided to requesting physician.

## 2021-12-27 NOTE — PATIENT INSTRUCTIONS
Patient should take the following medications the morning of surgery with a small sip of water: hold all meds.  Patient was instructed to hold the following medications the morning of surgery: hold all other medications.     Patient was advised to call our office and the surgical services with any change in condition or new symptoms if they were to develop between today and their surgical date.  Especially any cardiopulmonary symptoms or symptoms concerning for an infection.     Discontinue aspirin, aleve, naproxen and ibuprofen 7 days prior to surgery to reduce bleeding risk.  It is fine to take tylenol the week before surgery.  Hold vitamins and herbal remedies for 7 days before surgery.    Please have a Backdoor COVID-19 test on 12/30/2021 and 1/13/2022 if needed.   Please stay in your car and call [phone] when you arrive.      Preparing for Your Surgery  Getting started  A nurse will call you to review your health history and instructions. They will give you an arrival time based on your scheduled surgery time. Please be ready to share:    Your doctor's clinic name and phone number    Your medical, surgical and anesthesia history    A list of allergies and sensitivities    A list of medicines, including herbal treatments and over-the-counter drugs    Whether the patient has a legal guardian (ask how to send us the papers in advance)  Please tell us if you're pregnant--or if there's any chance you might be pregnant. Some surgeries may injure a fetus (unborn baby), so they require a pregnancy test. Surgeries that are safe for a fetus don't always need a test, and you can choose whether to have one.   If you have a child who's having surgery, please ask for a copy of Preparing for Your Child's Surgery.    Preparing for surgery    Within 30 days of surgery: Have a pre-op exam (sometimes called an H&P, or History and Physical). This can be done at a clinic or pre-operative center.  ? If you're having a  , you may not need this exam. Talk to your care team.    At your pre-op exam, talk to your care team about all medicines you take. If you need to stop any medicines before surgery, ask when to start taking them again.  ? We do this for your safety. Many medicines can make you bleed too much during surgery. Some change how well surgery (anesthesia) drugs work.    Call your insurance company to let them know you're having surgery. (If you don't have insurance, call [phone].)    Call your clinic if there's any change in your health. This includes signs of a cold or flu (sore throat, runny nose, cough, rash, fever). It also includes a scrape or scratch near the surgery site.    If you have questions on the day of surgery, call your hospital or surgery center.  COVID testing  You may need to be tested for COVID-19 before having surgery. If so, we will give you instructions.  Eating and drinking guidelines  For your safety: Unless your surgeon tells you otherwise, follow the guidelines below.    Eat and drink as usual until 8 hours before surgery. After that, no food or milk.    Drink clear liquids until 2 hours before surgery. These are liquids you can see through, like water, Gatorade and Propel Water. You may also have black coffee and tea (no cream or milk).    Nothing by mouth within 2 hours of surgery. This includes gum, candy and breath mints.    If you drink alcohol: Stop drinking it the night before surgery.    If your care team tells you to take medicine on the morning of surgery, it's okay to take it with a sip of water.  Preventing infection    Shower or bathe the night before and morning of your surgery. Follow the instructions your clinic gave you. (If no instructions, use regular soap.)    Don't shave or clip hair near your surgery site. We'll remove the hair if needed.    Don't smoke or vape the morning of surgery. You may chew nicotine gum up to 2 hours before surgery. A nicotine patch is  okay.  ? Note: Some surgeries require you to completely quit smoking and nicotine. Check with your surgeon.    Your care team will make every effort to keep you safe from infection. We will:  ? Clean our hands often with soap and water (or an alcohol-based hand rub).  ? Clean the skin at your surgery site with a special soap that kills germs.  ? Give you a special gown to keep you warm. (Cold raises the risk of infection.)  ? Wear special hair covers, masks, gowns and gloves during surgery.  ? Give antibiotic medicine, if prescribed. Not all surgeries need antibiotics.  What to bring on the day of surgery    Photo ID and insurance card    Copy of your health care directive, if you have one    Glasses and hearing aides (bring cases)  ? You can't wear contacts during surgery    Inhaler and eye drops, if you use them (tell us about these when you arrive)    CPAP machine or breathing device, if you use them    A few personal items, if spending the night    If you have . . .  ? A pacemaker, ICD (cardiac defibrillator) or other implant: Bring the ID card.  ? An implanted stimulator: Bring the remote control.  ? A legal guardian: Bring a copy of the certified (court-stamped) guardianship papers.  Please remove any jewelry, including body piercings. Leave jewelry and other valuables at home.  If you're going home the day of surgery    You must have a responsible adult drive you home. They should stay with you overnight as well.    If you don't have someone to stay with you, and you aren't safe to go home alone, we may keep you overnight. Insurance often won't pay for this.  After surgery  If it's hard to control your pain or you need more pain medicine, please call your surgeon's office.  Questions?   If you have any questions for your care team, list them here:  _________________________________________________________________________________________________________________________________________________________________________ ____________________________________ ____________________________________ ____________________________________  For informational purposes only. Not to replace the advice of your health care provider. Copyright   2019 [facility]. All rights reserved. Clinically reviewed by Dolores Zambrano MD. [employer] [id number] - REV .    Preparing for Your Surgery  Getting started  A nurse will call you to review your health history and instructions. They will give you an arrival time based on your scheduled surgery time. Please be ready to share:    Your doctor's clinic name and phone number    Your medical, surgical and anesthesia history    A list of allergies and sensitivities    A list of medicines, including herbal treatments and over-the-counter drugs    Whether the patient has a legal guardian (ask how to send us the papers in advance)  Please tell us if you're pregnant--or if there's any chance you might be pregnant. Some surgeries may injure a fetus (unborn baby), so they require a pregnancy test. Surgeries that are safe for a fetus don't always need a test, and you can choose whether to have one.   If you have a child who's having surgery, please ask for a copy of Preparing for Your Child's Surgery.    Preparing for surgery    Within 30 days of surgery: Have a pre-op exam (sometimes called an H&P, or History and Physical). This can be done at a clinic or pre-operative center.  ? If you're having a , you may not need this exam. Talk to your care team.    At your pre-op exam, talk to your care team about all medicines you take. If you need to stop any medicines before surgery, ask when to start taking them again.  ? We do this for your safety. Many medicines can make you bleed too much during surgery. Some change how well  surgery (anesthesia) drugs work.    Call your insurance company to let them know you're having surgery. (If you don't have insurance, call [phone].)    Call your clinic if there's any change in your health. This includes signs of a cold or flu (sore throat, runny nose, cough, rash, fever). It also includes a scrape or scratch near the surgery site.    If you have questions on the day of surgery, call your hospital or surgery center.  COVID testing  You may need to be tested for COVID-19 before having surgery. If so, we will give you instructions.  Eating and drinking guidelines  For your safety: Unless your surgeon tells you otherwise, follow the guidelines below.    Eat and drink as usual until 8 hours before surgery. After that, no food or milk.    Drink clear liquids until 2 hours before surgery. These are liquids you can see through, like water, Gatorade and Propel Water. You may also have black coffee and tea (no cream or milk).    Nothing by mouth within 2 hours of surgery. This includes gum, candy and breath mints.    If you drink alcohol: Stop drinking it the night before surgery.    If your care team tells you to take medicine on the morning of surgery, it's okay to take it with a sip of water.  Preventing infection    Shower or bathe the night before and morning of your surgery. Follow the instructions your clinic gave you. (If no instructions, use regular soap.)    Don't shave or clip hair near your surgery site. We'll remove the hair if needed.    Don't smoke or vape the morning of surgery. You may chew nicotine gum up to 2 hours before surgery. A nicotine patch is okay.  ? Note: Some surgeries require you to completely quit smoking and nicotine. Check with your surgeon.    Your care team will make every effort to keep you safe from infection. We will:  ? Clean our hands often with soap and water (or an alcohol-based hand rub).  ? Clean the skin at your surgery site with a special soap that kills  germs.  ? Give you a special gown to keep you warm. (Cold raises the risk of infection.)  ? Wear special hair covers, masks, gowns and gloves during surgery.  ? Give antibiotic medicine, if prescribed. Not all surgeries need antibiotics.  What to bring on the day of surgery    Photo ID and insurance card    Copy of your health care directive, if you have one    Glasses and hearing aides (bring cases)  ? You can't wear contacts during surgery    Inhaler and eye drops, if you use them (tell us about these when you arrive)    CPAP machine or breathing device, if you use them    A few personal items, if spending the night    If you have . . .  ? A pacemaker, ICD (cardiac defibrillator) or other implant: Bring the ID card.  ? An implanted stimulator: Bring the remote control.  ? A legal guardian: Bring a copy of the certified (court-stamped) guardianship papers.  Please remove any jewelry, including body piercings. Leave jewelry and other valuables at home.  If you're going home the day of surgery    You must have a responsible adult drive you home. They should stay with you overnight as well.    If you don't have someone to stay with you, and you aren't safe to go home alone, we may keep you overnight. Insurance often won't pay for this.  After surgery  If it's hard to control your pain or you need more pain medicine, please call your surgeon's office.  Questions?   If you have any questions for your care team, list them here: _________________________________________________________________________________________________________________________________________________________________________ ____________________________________ ____________________________________ ____________________________________  For informational purposes only. Not to replace the advice of your health care provider. Copyright   2003, 2019 [employer]. All rights reserved. Clinically reviewed by Dolores Zambrano MD. [employer] [id number] -  REV 07/21.

## 2021-12-29 ENCOUNTER — OFFICE VISIT (OUTPATIENT)
Dept: FAMILY MEDICINE | Facility: OTHER | Age: 36
End: 2021-12-29
Attending: PHYSICIAN ASSISTANT
Payer: COMMERCIAL

## 2021-12-29 VITALS
BODY MASS INDEX: 39.66 KG/M2 | RESPIRATION RATE: 16 BRPM | TEMPERATURE: 98.1 F | SYSTOLIC BLOOD PRESSURE: 118 MMHG | HEART RATE: 89 BPM | WEIGHT: 202 LBS | HEIGHT: 60 IN | DIASTOLIC BLOOD PRESSURE: 72 MMHG | OXYGEN SATURATION: 97 %

## 2021-12-29 DIAGNOSIS — J84.9 INTERSTITIAL LUNG DISEASE (H): ICD-10-CM

## 2021-12-29 DIAGNOSIS — F33.2 SEVERE EPISODE OF RECURRENT MAJOR DEPRESSIVE DISORDER, WITHOUT PSYCHOTIC FEATURES (H): ICD-10-CM

## 2021-12-29 DIAGNOSIS — Z01.818 PREOP GENERAL PHYSICAL EXAM: Primary | ICD-10-CM

## 2021-12-29 DIAGNOSIS — G56.03 BILATERAL CARPAL TUNNEL SYNDROME: ICD-10-CM

## 2021-12-29 LAB
HCG UR QL: NEGATIVE
HGB BLD-MCNC: 9.7 G/DL (ref 11.7–15.7)

## 2021-12-29 PROCEDURE — 36415 COLL VENOUS BLD VENIPUNCTURE: CPT | Mod: ZL | Performed by: PHYSICIAN ASSISTANT

## 2021-12-29 PROCEDURE — 85018 HEMOGLOBIN: CPT | Mod: ZL | Performed by: PHYSICIAN ASSISTANT

## 2021-12-29 PROCEDURE — 81025 URINE PREGNANCY TEST: CPT | Mod: ZL | Performed by: PHYSICIAN ASSISTANT

## 2021-12-29 PROCEDURE — 99214 OFFICE O/P EST MOD 30 MIN: CPT | Performed by: PHYSICIAN ASSISTANT

## 2021-12-29 RX ORDER — PREDNISONE 2.5 MG/1
2.5 TABLET ORAL DAILY
COMMUNITY
Start: 2021-12-29 | End: 2023-12-06

## 2021-12-29 ASSESSMENT — PAIN SCALES - GENERAL: PAINLEVEL: MILD PAIN (2)

## 2021-12-29 ASSESSMENT — MIFFLIN-ST. JEOR: SCORE: 1525.28

## 2021-12-29 NOTE — NURSING NOTE
Chief Complaint   Patient presents with     Pre-Op Exam     Preop:  1/3 right and 1/17 left carpal tunnel, [place]     Medication Reconciliation: complete    Jeniffer Collins CMA ([facility])

## 2022-01-17 ENCOUNTER — MYC MEDICAL ADVICE (OUTPATIENT)
Dept: INTERNAL MEDICINE | Facility: OTHER | Age: 37
End: 2022-01-17
Payer: COMMERCIAL

## 2022-01-17 ENCOUNTER — TELEPHONE (OUTPATIENT)
Dept: INTERNAL MEDICINE | Facility: OTHER | Age: 37
End: 2022-01-17
Payer: COMMERCIAL

## 2022-01-17 DIAGNOSIS — U07.1 INFECTION DUE TO 2019 NOVEL CORONAVIRUS: Primary | ICD-10-CM

## 2022-01-17 NOTE — TELEPHONE ENCOUNTER
Discussed with Jean Estrella and ok for rapid multiplex test in [facility] - please notify that it is approved and can be done if patient comes in.  LAUERL

## 2022-01-17 NOTE — TELEPHONE ENCOUNTER
Called and verified patient full name and . Notified patient that PCP is out today. Patient was calling regarding more information regarding the COVID antibody. Encouraged patient to go to [facility] she stated she did and the nearest location was 2 hours away and she has numerous other questions that writer encouraged patient contact [facility].      Lexi Oreilly LPN on 2022 at 12:02 PM

## 2022-01-17 NOTE — TELEPHONE ENCOUNTER
The patient did call back and  [facility] did approve the infusion  they said they wanted it done ASAP because of her current conditions.  Jessica needs this before they will do the infusion and want it done today.  Her specialists state she needs  a rapid covid test ordered by her primary care provider which is Dr Berry.  He is not here today. Could one of the other internal medicine doctors approve a rapid test so she can get infusion tomorrow in [place].   Please call her at [phone] regarding this.  It is okay to leave a message if she does not answer.

## 2022-01-17 NOTE — TELEPHONE ENCOUNTER
Looking for COVID Infusion please call, on her way to [place] for this but wanted to know if anywhere closer, please call, thank you!    Janet Ellis on 1/17/2022 at 10:56 AM

## 2022-01-29 ENCOUNTER — HEALTH MAINTENANCE LETTER (OUTPATIENT)
Age: 37
End: 2022-01-29

## 2022-02-07 DIAGNOSIS — R12 HEARTBURN: ICD-10-CM

## 2022-02-07 RX ORDER — OMEPRAZOLE 40 MG/1
CAPSULE, DELAYED RELEASE ORAL
Qty: 90 CAPSULE | Refills: 0 | Status: SHIPPED | OUTPATIENT
Start: 2022-02-07 | End: 2022-04-27

## 2022-02-07 NOTE — TELEPHONE ENCOUNTER
Prescription refilled per RN Medication Refill Policy..................Oly Richmond RN 2/7/2022 2:04 PM

## 2022-03-31 ENCOUNTER — OFFICE VISIT (OUTPATIENT)
Dept: PEDIATRICS | Facility: OTHER | Age: 37
End: 2022-03-31
Attending: INTERNAL MEDICINE
Payer: COMMERCIAL

## 2022-03-31 VITALS
SYSTOLIC BLOOD PRESSURE: 128 MMHG | OXYGEN SATURATION: 98 % | DIASTOLIC BLOOD PRESSURE: 80 MMHG | HEART RATE: 84 BPM | HEIGHT: 61 IN | TEMPERATURE: 97.6 F | RESPIRATION RATE: 20 BRPM | BODY MASS INDEX: 36.02 KG/M2 | WEIGHT: 190.8 LBS

## 2022-03-31 DIAGNOSIS — R53.83 FATIGUE, UNSPECIFIED TYPE: ICD-10-CM

## 2022-03-31 DIAGNOSIS — F41.1 GAD (GENERALIZED ANXIETY DISORDER): Primary | ICD-10-CM

## 2022-03-31 DIAGNOSIS — D50.9 IRON DEFICIENCY ANEMIA, UNSPECIFIED IRON DEFICIENCY ANEMIA TYPE: ICD-10-CM

## 2022-03-31 LAB
ALBUMIN SERPL-MCNC: 4.2 G/DL (ref 3.5–5.7)
ALP SERPL-CCNC: 70 U/L (ref 34–104)
ALT SERPL W P-5'-P-CCNC: 13 U/L (ref 7–52)
ANION GAP SERPL CALCULATED.3IONS-SCNC: 8 MMOL/L (ref 3–14)
AST SERPL W P-5'-P-CCNC: 16 U/L (ref 13–39)
BASOPHILS # BLD AUTO: 0 10E3/UL (ref 0–0.2)
BASOPHILS NFR BLD AUTO: 0 %
BILIRUB SERPL-MCNC: 0.3 MG/DL (ref 0.3–1)
BUN SERPL-MCNC: 8 MG/DL (ref 7–25)
CALCIUM SERPL-MCNC: 9.7 MG/DL (ref 8.6–10.3)
CHLORIDE BLD-SCNC: 104 MMOL/L (ref 98–107)
CO2 SERPL-SCNC: 26 MMOL/L (ref 21–31)
CREAT SERPL-MCNC: 0.78 MG/DL (ref 0.6–1.2)
CRP SERPL-MCNC: 14.4 MG/L
DEPRECATED CALCIDIOL+CALCIFEROL SERPL-MC: 34 UG/L (ref 30–100)
EOSINOPHIL # BLD AUTO: 0.2 10E3/UL (ref 0–0.7)
EOSINOPHIL NFR BLD AUTO: 2 %
ERYTHROCYTE [DISTWIDTH] IN BLOOD BY AUTOMATED COUNT: 17.8 % (ref 10–15)
ERYTHROCYTE [SEDIMENTATION RATE] IN BLOOD BY WESTERGREN METHOD: 25 MM/HR (ref 0–20)
GFR SERPL CREATININE-BSD FRML MDRD: >90 ML/MIN/1.73M2
GLUCOSE BLD-MCNC: 102 MG/DL (ref 70–105)
HCT VFR BLD AUTO: 33.9 % (ref 35–47)
HGB BLD-MCNC: 10.4 G/DL (ref 11.7–15.7)
IMM GRANULOCYTES # BLD: 0 10E3/UL
IMM GRANULOCYTES NFR BLD: 0 %
IRON SATN MFR SERPL: 5 % (ref 20–55)
IRON SERPL-MCNC: 23 UG/DL (ref 50–212)
LYMPHOCYTES # BLD AUTO: 2.5 10E3/UL (ref 0.8–5.3)
LYMPHOCYTES NFR BLD AUTO: 24 %
MCH RBC QN AUTO: 23.6 PG (ref 26.5–33)
MCHC RBC AUTO-ENTMCNC: 30.7 G/DL (ref 31.5–36.5)
MCV RBC AUTO: 77 FL (ref 78–100)
MONOCYTES # BLD AUTO: 1.2 10E3/UL (ref 0–1.3)
MONOCYTES NFR BLD AUTO: 11 %
NEUTROPHILS # BLD AUTO: 6.4 10E3/UL (ref 1.6–8.3)
NEUTROPHILS NFR BLD AUTO: 63 %
NRBC # BLD AUTO: 0 10E3/UL
NRBC BLD AUTO-RTO: 0 /100
PLATELET # BLD AUTO: 510 10E3/UL (ref 150–450)
POTASSIUM BLD-SCNC: 3.7 MMOL/L (ref 3.5–5.1)
PROT SERPL-MCNC: 7.6 G/DL (ref 6.4–8.9)
RBC # BLD AUTO: 4.41 10E6/UL (ref 3.8–5.2)
SODIUM SERPL-SCNC: 138 MMOL/L (ref 134–144)
TIBC SERPL-MCNC: 418.6 UG/DL (ref 245–400)
TRANSFERRIN SERPL-MCNC: 299 MG/DL (ref 203–362)
TSH SERPL DL<=0.005 MIU/L-ACNC: 1.07 MU/L (ref 0.4–4)
UIBC (UNSATURATED): 395.6 MG/DL
VIT B12 SERPL-MCNC: 439 PG/ML (ref 180–914)
WBC # BLD AUTO: 10.4 10E3/UL (ref 4–11)

## 2022-03-31 PROCEDURE — 99214 OFFICE O/P EST MOD 30 MIN: CPT | Performed by: INTERNAL MEDICINE

## 2022-03-31 PROCEDURE — 82607 VITAMIN B-12: CPT | Mod: ZL | Performed by: INTERNAL MEDICINE

## 2022-03-31 PROCEDURE — 82306 VITAMIN D 25 HYDROXY: CPT | Mod: ZL | Performed by: INTERNAL MEDICINE

## 2022-03-31 PROCEDURE — 36415 COLL VENOUS BLD VENIPUNCTURE: CPT | Mod: ZL | Performed by: INTERNAL MEDICINE

## 2022-03-31 PROCEDURE — 85652 RBC SED RATE AUTOMATED: CPT | Mod: ZL | Performed by: INTERNAL MEDICINE

## 2022-03-31 PROCEDURE — 83550 IRON BINDING TEST: CPT | Mod: ZL | Performed by: INTERNAL MEDICINE

## 2022-03-31 PROCEDURE — 86140 C-REACTIVE PROTEIN: CPT | Mod: ZL | Performed by: INTERNAL MEDICINE

## 2022-03-31 PROCEDURE — 84443 ASSAY THYROID STIM HORMONE: CPT | Mod: ZL | Performed by: INTERNAL MEDICINE

## 2022-03-31 PROCEDURE — 80053 COMPREHEN METABOLIC PANEL: CPT | Mod: ZL | Performed by: INTERNAL MEDICINE

## 2022-03-31 PROCEDURE — 85025 COMPLETE CBC W/AUTO DIFF WBC: CPT | Mod: ZL | Performed by: INTERNAL MEDICINE

## 2022-03-31 RX ORDER — FERROUS SULFATE 325(65) MG
325 TABLET, DELAYED RELEASE (ENTERIC COATED) ORAL DAILY
Qty: 90 TABLET | Refills: 3 | Status: SHIPPED | OUTPATIENT
Start: 2022-03-31

## 2022-03-31 RX ORDER — CITALOPRAM HYDROBROMIDE 20 MG/1
20 TABLET ORAL DAILY
Qty: 90 TABLET | Refills: 0 | Status: SHIPPED | OUTPATIENT
Start: 2022-03-31 | End: 2022-04-05

## 2022-03-31 ASSESSMENT — ANXIETY QUESTIONNAIRES
6. BECOMING EASILY ANNOYED OR IRRITABLE: NEARLY EVERY DAY
GAD7 TOTAL SCORE: 16
GAD7 TOTAL SCORE: 16
7. FEELING AFRAID AS IF SOMETHING AWFUL MIGHT HAPPEN: NOT AT ALL
GAD7 TOTAL SCORE: 16
7. FEELING AFRAID AS IF SOMETHING AWFUL MIGHT HAPPEN: NOT AT ALL
1. FEELING NERVOUS, ANXIOUS, OR ON EDGE: NEARLY EVERY DAY
2. NOT BEING ABLE TO STOP OR CONTROL WORRYING: NEARLY EVERY DAY
4. TROUBLE RELAXING: NEARLY EVERY DAY
5. BEING SO RESTLESS THAT IT IS HARD TO SIT STILL: SEVERAL DAYS
3. WORRYING TOO MUCH ABOUT DIFFERENT THINGS: NEARLY EVERY DAY

## 2022-03-31 ASSESSMENT — PAIN SCALES - GENERAL: PAINLEVEL: NO PAIN (0)

## 2022-03-31 ASSESSMENT — PATIENT HEALTH QUESTIONNAIRE - PHQ9
10. IF YOU CHECKED OFF ANY PROBLEMS, HOW DIFFICULT HAVE THESE PROBLEMS MADE IT FOR YOU TO DO YOUR WORK, TAKE CARE OF THINGS AT HOME, OR GET ALONG WITH OTHER PEOPLE: EXTREMELY DIFFICULT
SUM OF ALL RESPONSES TO PHQ QUESTIONS 1-9: 20
SUM OF ALL RESPONSES TO PHQ QUESTIONS 1-9: 20

## 2022-03-31 NOTE — PATIENT INSTRUCTIONS
-- Increase citalopram to 20 mg   -- Establish with a therapist   -- Daily exercise   -- Healthy nutrition    [facility] counselors/therapists   Telephone Hours Kids? Address   [facility]  (Many counselors) [phone] M-Th 8-5  F 8-12 Yes [address] [place] [address]   [email or web address]://[email or web address]   [facility]  (Many counselors) [phone]  Yes [address]   [email or web address]://[email or web address]   [facility]  (Many counselors) [phone]  Yes [address]  [email or web address]://[email or web address]   [facility]  Russell Beebe [phone]  Yes [facility]  [address] [place] [address], [address]  [email or web address]   [facility]  Francisco Dominguez [phone]  Yes [address] [place] [address]   [address]  [email or web address]   Tricia Moran [phone]   [address]  [email or web address]   [facility]  Saman Espinoza [phone]   [address] [facility] [address]  [email or web address]://www.[facility].[email or web address]/   Lucrecia Law [phone]   [address]   Lori Gibbons [phone]   [address] [place] [address]   Sandra Majano [phone]  Yes [address]   Randy Manley [phone]   [address] [facility]   Adalberto Cross [phone]   [address] [place] [address]   Karen Luca [phone]   [address] [place] [address]   [email or web address]://[email or web address]   Esther Blanc [phone]   [address] [place] [address] [address]  ([facility])  [email or web address]   [facility]  Tavo Fabian [phone]   [address] [facility]  Michele Rinne Cindy Thomas [phone]      [facility]: [place] [phone]  Yes SHAYLA Hansen  [address]  http://www.[facility].org/   [facility] [place] [phone]  Yes SHAYLA Lara  [address] [place] [address]  http://www.[email or web address].[email or web address]/

## 2022-03-31 NOTE — NURSING NOTE
Pt here for anxiety.    Pau Mcneil CMA ([facility])......................3/31/2022  9:50 AM       Medication Reconciliation: complete    Pau Mcneil CMA  3/31/2022 9:50 AM

## 2022-03-31 NOTE — PROGRESS NOTES
Assessment & Plan   1. VICKY (generalized anxiety disorder)  We had a lengthy discussion today with regard to anxiety.  I encouraged her to establish with a therapist.  Medications options discussed and we decided to increase dose of citalopram.  Close follow-up is recommended.  - citalopram (CELEXA) 20 MG tablet; Take 1 tablet (20 mg) by mouth daily  Dispense: 90 tablet; Refill: 0    2. Fatigue, unspecified type  - Comprehensive metabolic panel; Future  - CBC with Platelets & Differential; Future  - TSH; Future  - Vitamin B12; Future  - Vitamin D Deficiency; Future  - Erythrocyte sedimentation rate auto; Future  - CRP inflammation; Future  - Comprehensive metabolic panel  - CBC with Platelets & Differential  - TSH  - Vitamin B12  - Vitamin D Deficiency  - Erythrocyte sedimentation rate auto  - CRP inflammation    3. Iron deficiency anemia, unspecified iron deficiency anemia type  - Iron binding panel; Future  - ferrous sulfate (FE TABS) 325 (65 Fe) MG EC tablet; Take 1 tablet (325 mg) by mouth daily  Dispense: 90 tablet; Refill: 3  - Iron binding panel      Patient Instructions      -- Increase citalopram to 20 mg   -- Establish with a therapist   -- Daily exercise   -- Healthy nutrition    [place] counselors/therapists   Telephone Hours Kids? Address   [facility]  (Many counselors) [phone] M-Th 8-5  F 8-12 Yes [address]   [email or web address]://[email or web address]   [facility]  (Many counselors) [phone]  Yes [address]   [email or web address]://[email or web address]   [facility]  (Many counselors) [phone]  Yes [address]  [email or web address]://[email or web address]   [facility]  Russell Beebe [phone]  Yes [facility]  [address]  [email or web address]   [facility]  Francisco Dominguez [phone]  Yes [address]   [address]  http://[employer]/   Tricia Moran [phone]   [address]  "Heather  [email or web address]   [facility]  Saman Garciamars  Lea Espinoza [phone]   [address] [facility] [address]  [email or web address]://[email or web address]   Lucrecia Foy [phone]   [address]   Lori Gibbons [phone]   [address]   Sandra Majano [phone]  Yes [address]   Randy Manley [phone]   [address] [place]    Adalberto Cross [phone]   [address] [place] [address]   Karen Segovia [phone]   10   [address] [facility]   [email or web address]   Esther Blanc [phone]   [address] [place] [address] [address]  ([facility])  [email or web address]   [facility]  Tavo Fabian [phone]   [address] [facility]  Michele Rinne Cindy Thomas [phone]      [facility] [place] [phone]  [place]  [address]  http://www.[facility].[email or web address]/   [facility]: [place] [phone]  Yes [place]  [address]  http://www.[facility].[email or web address]/           Return in about 1 month (around 4/30/2022) for anxiety.    Signed, Nawaf Brar MD, FAAP, FACP  Internal Medicine & Pediatrics    Subjective   Maria G Haley is a 36 year old female who presents for discuss anxiety.  She has chronic anxiety which has been worse lately.    Objective   Vitals: /80 (BP Location: Right arm, Patient Position: Sitting, Cuff Size: Adult Large)   Pulse 84   Temp 97.6  F (36.4  C)   Resp 20   Ht 1.537 m (5' 0.5\")   Wt 86.5 kg (190 lb 12.8 oz)   LMP 03/27/2022   SpO2 98%   BMI 36.65 kg/m        Review and Analysis of Data   I personally reviewed the following:  External notes: No  Results: Yes Available lab work reviewed  Use of an independent historian: No  Independent review of a test performed by another physician: No  Discussion of management with another physician: No  Low risk of morbidity from " additional diagnostic testing and/or treatment.

## 2022-04-01 ASSESSMENT — ANXIETY QUESTIONNAIRES: GAD7 TOTAL SCORE: 16

## 2022-04-01 ASSESSMENT — PATIENT HEALTH QUESTIONNAIRE - PHQ9: SUM OF ALL RESPONSES TO PHQ QUESTIONS 1-9: 20

## 2022-04-03 ENCOUNTER — MYC MEDICAL ADVICE (OUTPATIENT)
Dept: INTERNAL MEDICINE | Facility: OTHER | Age: 37
End: 2022-04-03
Payer: COMMERCIAL

## 2022-04-05 ENCOUNTER — VIRTUAL VISIT (OUTPATIENT)
Dept: INTERNAL MEDICINE | Facility: OTHER | Age: 37
End: 2022-04-05
Attending: INTERNAL MEDICINE
Payer: COMMERCIAL

## 2022-04-05 DIAGNOSIS — F41.0 PANIC DISORDER: Primary | ICD-10-CM

## 2022-04-05 DIAGNOSIS — F41.1 GAD (GENERALIZED ANXIETY DISORDER): ICD-10-CM

## 2022-04-05 PROCEDURE — 99214 OFFICE O/P EST MOD 30 MIN: CPT | Mod: 95 | Performed by: INTERNAL MEDICINE

## 2022-04-05 RX ORDER — CLONAZEPAM 1 MG/1
.5-1 TABLET ORAL 2 TIMES DAILY PRN
Qty: 60 TABLET | Refills: 1 | Status: SHIPPED | OUTPATIENT
Start: 2022-04-05 | End: 2022-05-12

## 2022-04-05 RX ORDER — CITALOPRAM HYDROBROMIDE 20 MG/1
10 TABLET ORAL DAILY
Qty: 90 TABLET | Refills: 0 | COMMUNITY
Start: 2022-04-05 | End: 2022-05-12

## 2022-04-05 NOTE — PROGRESS NOTES
Maria G is a 36 year old who is being evaluated via a billable telephone visit.      What phone number would you like to be contacted at? [phone]  How would you like to obtain your AVS? Tacho    Assessment & Plan       ICD-10-CM    1. Panic disorder  F41.0 citalopram (CELEXA) 20 MG tablet     clonazePAM (KLONOPIN) 1 MG tablet   2. VICKY (generalized anxiety disorder)  F41.1 citalopram (CELEXA) 20 MG tablet     clonazePAM (KLONOPIN) 1 MG tablet     Patient contacted via telephone appointment due to virus pandemic.  She is having a lot more anxiety.  She got her third Covid vaccination, her anxiety has been much higher and much worse than it had ever really been before.  She recently saw someone in clinic and was told to increase her citalopram up to 20 mg daily.  She tried this and the following day she was sitting at home and crying all day long she felt it was the most terrible idea in her body did not care for that plan.  She subsequently went back down to 10 mg of citalopram daily.  She is supposed to go on a [employer] vacation at the end of the week and is not sure she can even do this right now.  She has been having a lot of anxiety and panic and has not actually even been able to go to work recently.  She had a few clonazepam tablets left from years ago prescription and started using them once or twice a day periodically she had 6 tablets left from her old prescription.  Found it actually was very helpful she was actually able to function and take only 1 tablet in the morning 0.5 mg by the evening she was doing much better.  Recommend going back down to the citalopram 10 mg daily.  Prescription of clonazepam 0.5-1 mg tablet twice daily for anxiety was sent to pharmacy.  She is due for her annual physical.  We will get this scheduled in 4 to 6 weeks for follow-up.    Encouraged her to start using clonazepam as prescribed and to make sure she gets her mental health to a point where she can go on her vacation  because sunshine and warmth will probably help her a lot mentally.  Close follow-up advised.     website reviewed.  Patient had some Percocet prescribed in early January.    Encouraged regular exercise.     No follow-ups on file.    Jluis Berry MD  [facility]     Edin Cummins is a 36 year old who presents for the following health issues     HPI       Review of Systems   -See HPI above.  Additionally denies shortness of breath, chest pain.  No abdominal pain.  No bowel or bladder problems.  No recent fevers or chills.      Objective           Vitals:  No vitals were obtained today due to virtual visit.    Physical Exam   alert, no distress and crying  PSYCH: Alert and oriented times 3; coherent speech, normal   rate and volume, able to articulate logical thoughts, able   to abstract reason, no tangential thoughts, no hallucinations   or delusions  Her affect is tearful  RESP: No cough, no audible wheezing, able to talk in full sentences  Remainder of exam unable to be completed due to telephone visits            Phone call duration: 11 minutes

## 2022-05-12 ENCOUNTER — OFFICE VISIT (OUTPATIENT)
Dept: INTERNAL MEDICINE | Facility: OTHER | Age: 37
End: 2022-05-12
Attending: INTERNAL MEDICINE
Payer: COMMERCIAL

## 2022-05-12 VITALS
DIASTOLIC BLOOD PRESSURE: 72 MMHG | WEIGHT: 189.4 LBS | HEIGHT: 60 IN | SYSTOLIC BLOOD PRESSURE: 116 MMHG | OXYGEN SATURATION: 98 % | RESPIRATION RATE: 16 BRPM | BODY MASS INDEX: 37.18 KG/M2 | TEMPERATURE: 97 F | HEART RATE: 78 BPM

## 2022-05-12 DIAGNOSIS — Z79.899 CHRONIC PRESCRIPTION BENZODIAZEPINE USE: ICD-10-CM

## 2022-05-12 DIAGNOSIS — K21.00 GASTROESOPHAGEAL REFLUX DISEASE WITH ESOPHAGITIS WITHOUT HEMORRHAGE: Primary | ICD-10-CM

## 2022-05-12 DIAGNOSIS — K44.9 LARGE HIATAL HERNIA: ICD-10-CM

## 2022-05-12 DIAGNOSIS — F41.0 PANIC DISORDER: ICD-10-CM

## 2022-05-12 DIAGNOSIS — D84.9 IMMUNOCOMPROMISED STATE (H): ICD-10-CM

## 2022-05-12 DIAGNOSIS — F41.1 GAD (GENERALIZED ANXIETY DISORDER): ICD-10-CM

## 2022-05-12 DIAGNOSIS — Z00.00 ANNUAL PHYSICAL EXAM: ICD-10-CM

## 2022-05-12 DIAGNOSIS — F33.42 RECURRENT MAJOR DEPRESSIVE DISORDER, IN FULL REMISSION (H): ICD-10-CM

## 2022-05-12 DIAGNOSIS — E66.01 MORBID OBESITY (H): ICD-10-CM

## 2022-05-12 DIAGNOSIS — Z71.85 VACCINE COUNSELING: ICD-10-CM

## 2022-05-12 DIAGNOSIS — G47.33 MILD OBSTRUCTIVE SLEEP APNEA: ICD-10-CM

## 2022-05-12 DIAGNOSIS — E78.2 MIXED HYPERLIPIDEMIA: ICD-10-CM

## 2022-05-12 DIAGNOSIS — J84.9 INTERSTITIAL LUNG DISEASE (H): ICD-10-CM

## 2022-05-12 DIAGNOSIS — M33.20 POLYMYOSITIS (H): ICD-10-CM

## 2022-05-12 PROCEDURE — 99395 PREV VISIT EST AGE 18-39: CPT | Performed by: INTERNAL MEDICINE

## 2022-05-12 PROCEDURE — 99214 OFFICE O/P EST MOD 30 MIN: CPT | Mod: 25 | Performed by: INTERNAL MEDICINE

## 2022-05-12 RX ORDER — CLONAZEPAM 1 MG/1
1 TABLET ORAL DAILY PRN
Qty: 90 TABLET | Refills: 1 | Status: SHIPPED | OUTPATIENT
Start: 2022-05-12

## 2022-05-12 RX ORDER — OMEPRAZOLE 40 MG/1
40 CAPSULE, DELAYED RELEASE ORAL DAILY
Qty: 90 CAPSULE | Refills: 4 | Status: SHIPPED | OUTPATIENT
Start: 2022-05-12 | End: 2023-05-23

## 2022-05-12 RX ORDER — CITALOPRAM HYDROBROMIDE 10 MG/1
10 TABLET ORAL DAILY
COMMUNITY
Start: 2022-05-12 | End: 2022-10-17

## 2022-05-12 ASSESSMENT — ANXIETY QUESTIONNAIRES
4. TROUBLE RELAXING: NOT AT ALL
1. FEELING NERVOUS, ANXIOUS, OR ON EDGE: SEVERAL DAYS
7. FEELING AFRAID AS IF SOMETHING AWFUL MIGHT HAPPEN: NOT AT ALL
8. IF YOU CHECKED OFF ANY PROBLEMS, HOW DIFFICULT HAVE THESE MADE IT FOR YOU TO DO YOUR WORK, TAKE CARE OF THINGS AT HOME, OR GET ALONG WITH OTHER PEOPLE?: SOMEWHAT DIFFICULT
7. FEELING AFRAID AS IF SOMETHING AWFUL MIGHT HAPPEN: NOT AT ALL
3. WORRYING TOO MUCH ABOUT DIFFERENT THINGS: SEVERAL DAYS
GAD7 TOTAL SCORE: 3
GAD7 TOTAL SCORE: 3
6. BECOMING EASILY ANNOYED OR IRRITABLE: SEVERAL DAYS
2. NOT BEING ABLE TO STOP OR CONTROL WORRYING: NOT AT ALL
GAD7 TOTAL SCORE: 3
5. BEING SO RESTLESS THAT IT IS HARD TO SIT STILL: NOT AT ALL

## 2022-05-12 ASSESSMENT — ENCOUNTER SYMPTOMS
CONFUSION: 0
BRUISES/BLEEDS EASILY: 0
WOUND: 0
CONSTIPATION: 1
VOMITING: 0
DIARRHEA: 0
LIGHT-HEADEDNESS: 0
ARTHRALGIAS: 0
SLEEP DISTURBANCE: 0
MYALGIAS: 0
DIZZINESS: 0
CHILLS: 0
NAUSEA: 0
SHORTNESS OF BREATH: 0
HEMATURIA: 0
WHEEZING: 0
ABDOMINAL PAIN: 1
NERVOUS/ANXIOUS: 1
COUGH: 0
AGITATION: 0
FEVER: 0
DYSURIA: 0

## 2022-05-12 ASSESSMENT — PAIN SCALES - GENERAL: PAINLEVEL: NO PAIN (0)

## 2022-05-12 ASSESSMENT — PATIENT HEALTH QUESTIONNAIRE - PHQ9
10. IF YOU CHECKED OFF ANY PROBLEMS, HOW DIFFICULT HAVE THESE PROBLEMS MADE IT FOR YOU TO DO YOUR WORK, TAKE CARE OF THINGS AT HOME, OR GET ALONG WITH OTHER PEOPLE: NOT DIFFICULT AT ALL
SUM OF ALL RESPONSES TO PHQ QUESTIONS 1-9: 5
SUM OF ALL RESPONSES TO PHQ QUESTIONS 1-9: 5

## 2022-05-12 NOTE — PATIENT INSTRUCTIONS
Blood pressure is well controlled.   Medications refilled.     Last labs --- low B12, Iron was low.     Schedule appointment with one of the Nurse Practitioners --- for your female health screening exams.       Referral placed:    -- [facility] -- Discuss Nissen Fundoplication surgery      Follow-up prior to running out of Klonopin... otherwise...    Return in approximately 1 year, or sooner as needed for follow-up with Dr. Berry.  - Annual Follow-up / Physical    Clinic : [phone]  Appointment line: [phone]

## 2022-05-12 NOTE — PROGRESS NOTES
Assessment & Plan       ICD-10-CM    1. Gastroesophageal reflux disease with esophagitis without hemorrhage  K21.00 omeprazole (PRILOSEC) 40 MG DR capsule     Adult General Surg Referral   2. Annual physical exam  Z00.00    3. Immunocompromised state (H)  D84.9    4. Polymyositis (H)  M33.20    5. Interstitial lung disease (H)  J84.9    6. Morbid obesity (H)  E66.01    7. Mixed hyperlipidemia  E78.2    8. Mild obstructive sleep apnea - Sleep Study 1/16/2020 - [facility] - Uses CPAP nightly, finds helpful and beneficial  G47.33    9. Recurrent major depressive disorder, in full remission (H)  F33.42    10. Vaccine counseling  Z71.85    11. Large hiatal hernia  K44.9 omeprazole (PRILOSEC) 40 MG DR capsule     Adult General Surg Referral   12. VICKY (generalized anxiety disorder)  F41.1 citalopram (CELEXA) 10 MG tablet     clonazePAM (KLONOPIN) 1 MG tablet   13. Panic disorder  F41.0 citalopram (CELEXA) 10 MG tablet     clonazePAM (KLONOPIN) 1 MG tablet   14. Chronic prescription benzodiazepine use  Z79.899 clonazePAM (KLONOPIN) 1 MG tablet   Patient presents for annual physical examination as well as follow-up multiple issues.    Patient was on CellCept for her polymyositis.  She has interstitial lung disease.  Now just on low-dose prednisone as her immunosuppressive medication 2.5 mg daily.  Still following with her rheumatologist.  She has follow-up again in the near future and has lab work pending once again.    Obesity, discussed need for reduced oral caloric intake.  Regular exercise.  Reduce carbohydrate intake.  Information printed and reviewed.    Sleep apnea.  Noted with sleep study from 2 years ago.  Recently got her CPAP.  Finds helpful and beneficial.  Encouraged continued use.    Mixed hyperlipidemia.  Cluster levels are quite high.  Hopefully with healthy diet exercise and weight loss this will improve otherwise we will need to consider medications to help.    History of depression, now in remission.   Still has some issues with anxiety and panic disorder but once daily clonazepam 1 mg tablet in the morning about 4 AM has been quite effective.  She had she did not take 1 this morning intentionally.  Doing well with citalopram at 10 mg daily.  About a month ago she started to have severe panic attacks and anxiety and felt stuck at home for almost 2 weeks and could not go to work.  She was started on clonazepam and her citalopram dose was changed back to just 10 mg daily.  She was crying substantially after the dose increase up to 20 mg daily.  She is not doing much better.  She hopes to stop the clonazepam or even simply cut down on the dosing as able.   website reviewed.  No opiate use.  Prescription refilled x90 tablets with 1 refill.  We will need to complete a controlled substance agreement if she is to continue to use this.    Vaccine counseling completed.  Consider hepatitis vaccination otherwise everything is up-to-date.    Heartburn and reflux.  Has large hiatal hernia.  She was try to deal with all of her autoimmune disorder prior to getting Nissen fundoplication surgery.  She would like referral.  Surgical referral to [facility] to consider Nissen fundoplication surgery was requested.  Continues with omeprazole 40 mg daily for now.  Needs refills.    Encouraged weight loss and regular exercise.     Return in about 1 year (around 5/12/2023).    Jluis Berry MD  [facility] is a 37 year old who presents for the following health issues Physical    Healthy Habits:     Getting at least 3 servings of Calcium per day:  NO    Bi-annual eye exam:  NO    Dental care twice a year:  NO    Sleep apnea or symptoms of sleep apnea:  Sleep apnea    Diet:  Regular (no restrictions)    Frequency of exercise:  None    Taking medications regularly:  Yes    Medication side effects:  None    PHQ-2 Total Score: 1    Additional concerns today:  No     Review of Systems  "  Constitutional: Negative for chills and fever.   HENT: Negative for congestion and hearing loss.    Eyes: Negative for visual disturbance.   Respiratory: Negative for cough, shortness of breath and wheezing.    Cardiovascular: Negative for chest pain.   Gastrointestinal: Positive for abdominal pain (Lower pelvic cramping) and constipation (Mild with iron supplement). Negative for diarrhea, nausea and vomiting.        Reflux, better with Omeprazole.   Endocrine: Negative for cold intolerance and heat intolerance.   Genitourinary: Negative for dysuria and hematuria.   Musculoskeletal: Negative for arthralgias and myalgias.   Skin: Negative for rash and wound.   Allergic/Immunologic: Positive for immunocompromised state.   Neurological: Negative for dizziness and light-headedness.   Hematological: Does not bruise/bleed easily.   Psychiatric/Behavioral: Negative for agitation, confusion and sleep disturbance. The patient is nervous/anxious (Still some, but MUCH Better. ).           Objective    /72 (BP Location: Right arm, Patient Position: Sitting, Cuff Size: Adult Large)   Pulse 78   Temp 97  F (36.1  C) (Temporal)   Resp 16   Ht 1.53 m (5' 0.25\")   Wt 85.9 kg (189 lb 6.4 oz)   LMP 03/27/2022   SpO2 98%   Breastfeeding No   BMI 36.68 kg/m    Body mass index is 36.68 kg/m .  Physical Exam  Constitutional:       General: She is not in acute distress.     Appearance: She is well-developed. She is obese. She is not diaphoretic.   HENT:      Head: Normocephalic and atraumatic.   Eyes:      General: No scleral icterus.     Conjunctiva/sclera: Conjunctivae normal.   Cardiovascular:      Rate and Rhythm: Normal rate and regular rhythm.   Pulmonary:      Effort: Pulmonary effort is normal.      Breath sounds: Normal breath sounds.   Abdominal:      Palpations: Abdomen is soft.      Tenderness: There is no abdominal tenderness.   Musculoskeletal:         General: No deformity.      Cervical back: Neck supple. "   Lymphadenopathy:      Cervical: No cervical adenopathy.   Skin:     General: Skin is warm and dry.      Coloration: Skin is not jaundiced.      Findings: No rash.   Neurological:      Mental Status: She is alert and oriented to person, place, and time. Mental status is at baseline.   Psychiatric:         Mood and Affect: Mood normal.         Behavior: Behavior normal.          Recent Labs   Lab Test 03/31/22  1012 02/27/18  1243 11/14/17  1134 08/21/17  1026 02/23/16  1055   LDL  --  154*  --   --  168*   HDL  --  38  --   --  39   TRIG  --  295*  --   --  176*   ALT 13 15  --   --   --    CR 0.78 0.69 0.70   < > 0.72   GFRESTIMATED >90 >90  --   --   --    GFRESTBLACK  --  >90 >60   < > >60   POTASSIUM 3.7 3.8 4.2   < > 4.3   TSH 1.07 1.19  --   --   --     < > = values in this interval not displayed.   Recent labs showed normal ALT, creatinine, potassium and TSH.  Previous LDL and triglycerides were quite high.  HDL is within normal range.        Answers for HPI/ROS submitted by the patient on 5/12/2022  If you checked off any problems, how difficult have these problems made it for you to do your work, take care of things at home, or get along with other people?: Not difficult at all  PHQ9 TOTAL SCORE: 5  VICKY 7 TOTAL SCORE: 3

## 2022-05-12 NOTE — NURSING NOTE
"Chief Complaint   Patient presents with     Physical         Initial /72 (BP Location: Right arm, Patient Position: Sitting, Cuff Size: Adult Large)   Pulse 78   Temp 97  F (36.1  C) (Temporal)   Resp 16   Ht 1.53 m (5' 0.25\")   Wt 85.9 kg (189 lb 6.4 oz)   LMP 03/27/2022   SpO2 98%   Breastfeeding No   BMI 36.68 kg/m   Estimated body mass index is 36.68 kg/m  as calculated from the following:    Height as of this encounter: 1.53 m (5' 0.25\").    Weight as of this encounter: 85.9 kg (189 lb 6.4 oz).       FOOD SECURITY SCREENING QUESTIONS:    The next two questions are to help us understand your food security.  If you are feeling you need any assistance in this area, we have resources available to support you today.    Hunger Vital Signs:  Within the past 12 months we worried whether our food would run out before we got money to buy more. Never  Within the past 12 months the food we bought just didn't last and we didn't have money to get more. Never    Advance Care Directive on file? no      Medication reconciliation complete.      Lucio Bolanos,on 5/12/2022 at 11:33 AM        "

## 2022-05-13 ASSESSMENT — ANXIETY QUESTIONNAIRES: GAD7 TOTAL SCORE: 3

## 2022-05-13 ASSESSMENT — PATIENT HEALTH QUESTIONNAIRE - PHQ9: SUM OF ALL RESPONSES TO PHQ QUESTIONS 1-9: 5

## 2022-07-20 ENCOUNTER — TELEPHONE (OUTPATIENT)
Dept: INTERNAL MEDICINE | Facility: OTHER | Age: 37
End: 2022-07-20

## 2022-07-20 NOTE — TELEPHONE ENCOUNTER
Detailed message left for patient stating Dr. Berry does not having any opening for tomorrow.  Suggestion given to go to  or to call the clinic and see who does have an opening available.  I also left our phone number and told her to call us back if she has any further questions.    Lucio Bolanos .......  7/20/2022  4:38 PM

## 2022-07-20 NOTE — TELEPHONE ENCOUNTER
DJS-Reason for call: Patient wanting a work in appointment.    Patient is having the following symptoms:  A couple of days pain in her back she feels that it is her lungs      The patient is requesting an appointment with  DJS    Was an appointment offered for this call? Yes    If Yes, what is the date of the appointment?  Into Aug     Preferred method for responding to this message: Telephone Call    Phone number patient can be reached at? Home number on file [phone] (home)    If we can't reach you directly, may we leave a detailed response at the number you provided? Yes    Can this message wait until your PCP/provider returns if unavailable today? Yes

## 2022-07-29 ENCOUNTER — OFFICE VISIT (OUTPATIENT)
Dept: FAMILY MEDICINE | Facility: OTHER | Age: 37
End: 2022-07-29
Attending: FAMILY MEDICINE
Payer: COMMERCIAL

## 2022-07-29 VITALS
OXYGEN SATURATION: 96 % | SYSTOLIC BLOOD PRESSURE: 120 MMHG | HEART RATE: 76 BPM | WEIGHT: 203 LBS | HEIGHT: 60 IN | RESPIRATION RATE: 16 BRPM | BODY MASS INDEX: 39.85 KG/M2 | DIASTOLIC BLOOD PRESSURE: 80 MMHG | TEMPERATURE: 98.2 F

## 2022-07-29 DIAGNOSIS — J84.9 INTERSTITIAL LUNG DISEASE (H): ICD-10-CM

## 2022-07-29 DIAGNOSIS — G47.33 MILD OBSTRUCTIVE SLEEP APNEA: ICD-10-CM

## 2022-07-29 DIAGNOSIS — K21.00 GASTROESOPHAGEAL REFLUX DISEASE WITH ESOPHAGITIS WITHOUT HEMORRHAGE: ICD-10-CM

## 2022-07-29 DIAGNOSIS — D50.9 IRON DEFICIENCY ANEMIA, UNSPECIFIED IRON DEFICIENCY ANEMIA TYPE: ICD-10-CM

## 2022-07-29 DIAGNOSIS — Z01.818 PREOP GENERAL PHYSICAL EXAM: Primary | ICD-10-CM

## 2022-07-29 DIAGNOSIS — F33.42 RECURRENT MAJOR DEPRESSIVE DISORDER, IN FULL REMISSION (H): ICD-10-CM

## 2022-07-29 DIAGNOSIS — K44.9 LARGE HIATAL HERNIA: ICD-10-CM

## 2022-07-29 DIAGNOSIS — M33.20 POLYMYOSITIS (H): ICD-10-CM

## 2022-07-29 DIAGNOSIS — E78.2 MIXED HYPERLIPIDEMIA: ICD-10-CM

## 2022-07-29 LAB
ANION GAP SERPL CALCULATED.3IONS-SCNC: 7 MMOL/L (ref 3–14)
BASOPHILS # BLD AUTO: 0.1 10E3/UL (ref 0–0.2)
BASOPHILS NFR BLD AUTO: 1 %
BUN SERPL-MCNC: 12 MG/DL (ref 7–25)
CALCIUM SERPL-MCNC: 9.3 MG/DL (ref 8.6–10.3)
CHLORIDE BLD-SCNC: 103 MMOL/L (ref 98–107)
CHOLEST SERPL-MCNC: 279 MG/DL
CO2 SERPL-SCNC: 26 MMOL/L (ref 21–31)
CREAT SERPL-MCNC: 0.71 MG/DL (ref 0.6–1.2)
CRP SERPL-MCNC: 10.8 MG/L
EOSINOPHIL # BLD AUTO: 0.3 10E3/UL (ref 0–0.7)
EOSINOPHIL NFR BLD AUTO: 2 %
ERYTHROCYTE [DISTWIDTH] IN BLOOD BY AUTOMATED COUNT: 15.5 % (ref 10–15)
ERYTHROCYTE [SEDIMENTATION RATE] IN BLOOD BY WESTERGREN METHOD: 29 MM/HR (ref 0–20)
FASTING STATUS PATIENT QL REPORTED: YES
GFR SERPL CREATININE-BSD FRML MDRD: >90 ML/MIN/1.73M2
GLUCOSE BLD-MCNC: 97 MG/DL (ref 70–105)
HCT VFR BLD AUTO: 35.6 % (ref 35–47)
HDLC SERPL-MCNC: 50 MG/DL (ref 23–92)
HGB BLD-MCNC: 11.4 G/DL (ref 11.7–15.7)
HOLD SPECIMEN: NORMAL
IMM GRANULOCYTES # BLD: 0 10E3/UL
IMM GRANULOCYTES NFR BLD: 0 %
LDLC SERPL CALC-MCNC: 170 MG/DL
LYMPHOCYTES # BLD AUTO: 2 10E3/UL (ref 0.8–5.3)
LYMPHOCYTES NFR BLD AUTO: 18 %
MCH RBC QN AUTO: 26.3 PG (ref 26.5–33)
MCHC RBC AUTO-ENTMCNC: 32 G/DL (ref 31.5–36.5)
MCV RBC AUTO: 82 FL (ref 78–100)
MONOCYTES # BLD AUTO: 1 10E3/UL (ref 0–1.3)
MONOCYTES NFR BLD AUTO: 9 %
NEUTROPHILS # BLD AUTO: 7.9 10E3/UL (ref 1.6–8.3)
NEUTROPHILS NFR BLD AUTO: 70 %
NONHDLC SERPL-MCNC: 229 MG/DL
NRBC # BLD AUTO: 0 10E3/UL
NRBC BLD AUTO-RTO: 0 /100
PLATELET # BLD AUTO: 437 10E3/UL (ref 150–450)
POTASSIUM BLD-SCNC: 4.4 MMOL/L (ref 3.5–5.1)
RBC # BLD AUTO: 4.33 10E6/UL (ref 3.8–5.2)
SODIUM SERPL-SCNC: 136 MMOL/L (ref 134–144)
TRIGL SERPL-MCNC: 294 MG/DL
TROPONIN I SERPL-MCNC: <2.4 PG/ML (ref 0–34)
WBC # BLD AUTO: 11.3 10E3/UL (ref 4–11)

## 2022-07-29 PROCEDURE — 84484 ASSAY OF TROPONIN QUANT: CPT | Mod: ZL | Performed by: FAMILY MEDICINE

## 2022-07-29 PROCEDURE — 85652 RBC SED RATE AUTOMATED: CPT | Mod: ZL | Performed by: FAMILY MEDICINE

## 2022-07-29 PROCEDURE — 80048 BASIC METABOLIC PNL TOTAL CA: CPT | Mod: ZL | Performed by: FAMILY MEDICINE

## 2022-07-29 PROCEDURE — 80061 LIPID PANEL: CPT | Mod: ZL | Performed by: FAMILY MEDICINE

## 2022-07-29 PROCEDURE — 36415 COLL VENOUS BLD VENIPUNCTURE: CPT | Mod: ZL | Performed by: FAMILY MEDICINE

## 2022-07-29 PROCEDURE — 85025 COMPLETE CBC W/AUTO DIFF WBC: CPT | Mod: ZL | Performed by: FAMILY MEDICINE

## 2022-07-29 PROCEDURE — 93000 ELECTROCARDIOGRAM COMPLETE: CPT | Performed by: INTERNAL MEDICINE

## 2022-07-29 PROCEDURE — 86140 C-REACTIVE PROTEIN: CPT | Mod: ZL | Performed by: FAMILY MEDICINE

## 2022-07-29 PROCEDURE — 99215 OFFICE O/P EST HI 40 MIN: CPT | Performed by: FAMILY MEDICINE

## 2022-07-29 ASSESSMENT — ANXIETY QUESTIONNAIRES
7. FEELING AFRAID AS IF SOMETHING AWFUL MIGHT HAPPEN: NOT AT ALL
GAD7 TOTAL SCORE: 1
4. TROUBLE RELAXING: NOT AT ALL
GAD7 TOTAL SCORE: 1
2. NOT BEING ABLE TO STOP OR CONTROL WORRYING: NOT AT ALL
GAD7 TOTAL SCORE: 1
5. BEING SO RESTLESS THAT IT IS HARD TO SIT STILL: NOT AT ALL
1. FEELING NERVOUS, ANXIOUS, OR ON EDGE: NOT AT ALL
3. WORRYING TOO MUCH ABOUT DIFFERENT THINGS: NOT AT ALL
IF YOU CHECKED OFF ANY PROBLEMS ON THIS QUESTIONNAIRE, HOW DIFFICULT HAVE THESE PROBLEMS MADE IT FOR YOU TO DO YOUR WORK, TAKE CARE OF THINGS AT HOME, OR GET ALONG WITH OTHER PEOPLE: NOT DIFFICULT AT ALL
8. IF YOU CHECKED OFF ANY PROBLEMS, HOW DIFFICULT HAVE THESE MADE IT FOR YOU TO DO YOUR WORK, TAKE CARE OF THINGS AT HOME, OR GET ALONG WITH OTHER PEOPLE?: NOT DIFFICULT AT ALL
6. BECOMING EASILY ANNOYED OR IRRITABLE: SEVERAL DAYS
7. FEELING AFRAID AS IF SOMETHING AWFUL MIGHT HAPPEN: NOT AT ALL

## 2022-07-29 ASSESSMENT — PAIN SCALES - GENERAL: PAINLEVEL: NO PAIN (0)

## 2022-07-29 ASSESSMENT — PATIENT HEALTH QUESTIONNAIRE - PHQ9
SUM OF ALL RESPONSES TO PHQ QUESTIONS 1-9: 3
SUM OF ALL RESPONSES TO PHQ QUESTIONS 1-9: 3
10. IF YOU CHECKED OFF ANY PROBLEMS, HOW DIFFICULT HAVE THESE PROBLEMS MADE IT FOR YOU TO DO YOUR WORK, TAKE CARE OF THINGS AT HOME, OR GET ALONG WITH OTHER PEOPLE: NOT DIFFICULT AT ALL

## 2022-07-29 NOTE — NURSING NOTE
Patient presents today for pre op exam.    Medication Reconciliation Complete    Mary Soni LPN  7/29/2022 10:46 AM

## 2022-07-29 NOTE — PROGRESS NOTES
[facility] AND [facility]  [address]  [place] [address]  Phone: [phone]  Fax: [phone]  Primary Provider: Jluis Berry  Pre-op Performing Provider: PATRICIA LOVELACE    PREOPERATIVE EVALUATION:  Today's date: 7/29/2022    Maria G Haley is a 37 year old female who presents for a preoperative evaluation.    Surgical Information:  Surgery/Procedure: EGD with Bravo, hiatal hernia repair  Surgery Location: [facility]  Surgeon: Dr. William  Surgery Date: 08/16/22  Time of Surgery:    Where patient plans to recover: At home with family  Fax number for surgical facility: [phone]    Type of Anesthesia Anticipated: General    Assessment & Plan     The proposed surgical procedure is considered INTERMEDIATE risk.    Preop general physical exam  Large hiatal hernia  Gastroesophageal reflux disease with esophagitis without hemorrhage  General surgery records reviewed.  Laboratory studies without significant change from previous.  EKD sinus rhythm without evidence of ischemia.  She is low risk for an intermediate risk procedure.  - EKG 12-lead, tracing only  - Troponin I  - CBC and Differential  - Basic Metabolic Panel    Polymyositis (H)  Interstitial lung disease (H)  Rheumatology and pulmonology records reviewed.  Due for ESR and CRP.  Labs ordered.  Continue Prednisone 2.5 mg daily.  Encouraged follow-up with her specialists.  - CRP inflammation  - Sedimentation Rate (ESR)    Mixed hyperlipidemia  Check lipid panel for ASCVD risk assessment.  - Lipid Panel    Iron deficiency anemia, unspecified iron deficiency anemia type  Improved from previous.  Continue iron supplementation.    Mild obstructive sleep apnea - Sleep Study 1/16/2020 - [facility] - Uses CPAP nightly, finds helpful and beneficial  Continue nightly CPAP.    Recurrent major depressive disorder, in full remission (H)  Well-controlled on current medication regimen.  Continue without adjustment.    Risks and  Recommendations:  The patient has the following additional risks and recommendations for perioperative complications:   - No identified additional risk factors other than previously addressed    Medication Instructions:   - Benzodiazepines: Continue without modification.   - SSRIs, SNRIs, TCAs, Antipsychotics: Continue without modification.    - Take usual dose on day of surgery    RECOMMENDATION:  APPROVAL GIVEN to proceed with proposed procedure, without further diagnostic evaluation.    43 minutes spent on the date of the encounter doing chart review, history and exam, documentation and further activities per the note      Subjective     HPI related to upcoming procedure:  Acid reflux:  She has a history of chronic acid reflux which has improved with Omeprazole, though her symptoms have continued to be incompletely controlled.  She has a hiatal hernia and will be undergoing EGD with BRAVO followed by hiatal hernia repair.  She reports that she has had symptoms of viral respiratory illness for the past three weeks.  Symptoms started with sore throat and progressed to productive cough, nasal congestion, chest tightness, and shortness of breath mildly worse than baseline.  Symptoms have been gradually improving, though she has continued to have some intermittent chest soreness and productive cough.    Preop Questions 7/29/2022   1. Have you ever had a heart attack or stroke? No   2. Have you ever had surgery on your heart or blood vessels, such as a stent placement, a coronary artery bypass, or surgery on an artery in your head, neck, heart, or legs? No   3. Do you have chest pain with activity? YES - Random sharp chest pain after having an upper respiratory viral illness   4. Do you have a history of  heart failure? No   5. Do you currently have a cold, bronchitis or symptoms of other infection? YES - Productive cough   6. Do you have a cough, shortness of breath, or wheezing? YES - Productive cough   7. Do you or  anyone in your family have previous history of blood clots? No   8. Do you or does anyone in your family have a serious bleeding problem such as prolonged bleeding following surgeries or cuts? UNKNOWN - No known history   9. Have you ever had problems with anemia or been told to take iron pills? YES - Anemia, managed on iron supplementation   10. Have you had any abnormal blood loss such as black, tarry or bloody stools, or abnormal vaginal bleeding? No   11. Have you ever had a blood transfusion? No   12. Are you willing to have a blood transfusion if it is medically needed before, during, or after your surgery? Yes   13. Have you or any of your relatives ever had problems with anesthesia? UNKNOWN - No known history   14. Do you have sleep apnea, excessive snoring or daytime drowsiness? YES - BARBY   14a. Do you have a CPAP machine? Yes   15. Do you have any artifical heart valves or other implanted medical devices like a pacemaker, defibrillator, or continuous glucose monitor? No   16. Do you have artificial joints? No   17. Are you allergic to latex? No   18. Is there any chance that you may be pregnant? No     Health Care Directive:  Patient does not have a Health Care Directive or Living Will: Discussed advance care planning with patient; however, patient declined at this time.    Preoperative Review of :   reviewed - controlled substances reflected in medication list.    Status of Chronic Conditions:  ANEMIA - Patient has a recent history of moderate anemia, which has not been symptomatic. Work up to date has revealed iron deficiency. Treatment has been iron supplementation.     DEPRESSION - Patient has a long history of Depression of moderate severity requiring medication for control with recent symptoms being stable. Current symptoms of depression include none.     HYPERLIPIDEMIA - Patient has a long history of significant Hyperlipidemia managed via diet and lifestyle with recent fair control.    BARBY -  Patient has a history of sleep apnea, managed with CPAP nightly.    POLYMYOSITIS, INTERSTITIAL LUNG DISEASE - Followed by rheumatology and pulmonology.  Currently managed on Prednisone 2.5 mg daily.  Needs specialist follow-up.    Review of Systems  Constitutional, neuro, ENT, endocrine, pulmonary, cardiac, gastrointestinal, genitourinary, musculoskeletal, integument and psychiatric systems are negative, except as otherwise noted.    Patient Active Problem List    Diagnosis Date Noted     Morbid obesity (H) 05/12/2022     Priority: Medium     Gastroesophageal reflux disease with esophagitis without hemorrhage 05/12/2022     Priority: Medium     VICKY (generalized anxiety disorder) 05/12/2022     Priority: Medium     Chronic prescription benzodiazepine use 05/12/2022     Priority: Medium     Mild obstructive sleep apnea - Sleep Study 1/16/2020 - [facility] - Uses CPAP nightly, finds helpful and beneficial 02/03/2020     Priority: Medium     Immunocompromised state (H) 10/31/2019     Priority: Medium     Large hiatal hernia 10/29/2019     Priority: Medium     Polymyositis (H) 01/24/2017     Priority: Medium     Interstitial lung disease (H) 01/20/2017     Priority: Medium     Recurrent major depressive disorder, in full remission (H) 12/08/2016     Priority: Medium     Mixed hyperlipidemia 02/24/2016     Priority: Medium     Obesity (BMI 30.0-34.9) 02/24/2016     Priority: Medium     Overview:   BMI 33.54 5/9/2017       Panic disorder 02/23/2016     Priority: Medium     Fibromyalgia muscle pain 06/21/2013     Priority: Medium     Constipation, chronic 01/13/2012     Priority: Medium     Heartburn 09/15/2011     Priority: Medium     Temporomandibular joint disorder 01/11/2011     Priority: Medium     Overview:   bilateral        Past Medical History:   Diagnosis Date     Calculus of kidney      8/13/2008,R kidney stone ct     Developmental disorder of scholastic skills     /depression ?ADHD     Dysplastic nevus 1/11/2011     Overview:  left leg     Encounter for immunization     2007     Gastro-esophageal reflux disease without esophagitis     No Comments Provided     History of nephrolithiasis 8/13/2008     Interstitial lung disease (H) 1/20/2017     Major depressive disorder, recurrent, mild (H)     2008     Nicotine dependence, uncomplicated     1/11/2011,Using nicoderm patch     Polymyositis (H) 1/24/2017     Past Surgical History:   Procedure Laterality Date     TONSILLECTOMY, ADENOIDECTOMY, COMBINED      age 5     TYMPANOSTOMY, LOCAL/TOPICAL ANESTHESIA      age 5,PC tubes     Current Outpatient Medications   Medication Sig Dispense Refill     B Complex-Biotin-FA (SUPER B-COMPLEX) TABS Take 1 tablet by mouth daily       Calcium-Magnesium 250-125 MG TABS Take 1 tablet by mouth daily       cholecalciferol (VITAMIN D3) 1000 units (25 mcg) capsule Take 1 capsule (1,000 Units) by mouth daily       citalopram (CELEXA) 10 MG tablet Take 1 tablet (10 mg) by mouth daily       clonazePAM (KLONOPIN) 1 MG tablet Take 1 tablet (1 mg) by mouth daily as needed for anxiety 90 tablet 1     ferrous sulfate (FE TABS) 325 (65 Fe) MG EC tablet Take 1 tablet (325 mg) by mouth daily 90 tablet 3     ibuprofen (ADVIL/MOTRIN) 800 MG tablet Take 800 mg by mouth every 8 hours as needed       omeprazole (PRILOSEC) 40 MG DR capsule Take 1 capsule (40 mg) by mouth daily Take 30 to 60 minutes prior to a meal 90 capsule 4     ondansetron (ZOFRAN-ODT) 4 MG ODT tab Take 1 tablet (4 mg) by mouth every 8 hours as needed for nausea 30 tablet 3     predniSONE (DELTASONE) 2.5 MG tablet Take 1 tablet (2.5 mg) by mouth daily       Allergies   Allergen Reactions     Nitrofurantoin Shortness Of Breath     Hydrocodone-Acetaminophen Nausea     Sertraline Anxiety     Increased patient's anxiety      Social History     Tobacco Use     Smoking status: Former Smoker     Packs/day: 1.00     Years: 15.00     Pack years: 15.00     Types: Cigarettes     Quit date: 4/4/2013      "Years since quittin.3     Smokeless tobacco: Never Used     Tobacco comment: Quit smoking: information provided on NRT   Substance Use Topics     Alcohol use: Yes     Comment: <Maybe once a year     Family History   Problem Relation Age of Onset     Asthma Sister      Cancer Maternal Grandmother         Cancer,lung cancer     Diabetes Other         Diabetes     History   Drug Use Unknown         Objective     /80   Pulse 76   Temp 98.2  F (36.8  C)   Resp 16   Ht 1.53 m (5' 0.25\")   Wt 92.1 kg (203 lb)   LMP 2022 (Exact Date)   SpO2 96%   BMI 39.32 kg/m      Physical Exam    GENERAL APPEARANCE: healthy, alert and no distress     EYES: EOMI, PERRL     HENT: ear canals and TM's normal and nose and mouth without ulcers or lesions     NECK: no adenopathy, no asymmetry, masses, or scars and thyroid normal to palpation     RESP: lungs clear to auscultation - no rales, rhonchi or wheezes     CV: regular rates and rhythm, normal S1 S2, no S3 or S4 and no murmur, click or rub, chest tender to palpation     ABDOMEN:  soft, nontender, no HSM or masses and bowel sounds normal     MS: extremities normal- no gross deformities noted, no evidence of inflammation in joints, FROM in all extremities.     NEURO: Normal strength and tone, mentation intact and speech normal     PSYCH: affect normal/bright     LYMPHATICS: No cervical adenopathy    Recent Labs   Lab Test 22  1012 21  1534   HGB 10.4* 9.7*   *  --      --    POTASSIUM 3.7  --    CR 0.78  --       Diagnostics:  Recent Results (from the past 24 hour(s))   Troponin I    Collection Time: 22 11:40 AM   Result Value Ref Range    Troponin I <2.4 0.0 - 34.0 pg/mL   CRP inflammation    Collection Time: 22 11:40 AM   Result Value Ref Range    CRP Inflammation 10.8 (H) <10.0 mg/L   Sedimentation Rate (ESR)    Collection Time: 22 11:40 AM   Result Value Ref Range    Erythrocyte Sedimentation Rate 29 (H) 0 - 20 mm/hr "   Basic Metabolic Panel    Collection Time: 07/29/22 11:40 AM   Result Value Ref Range    Sodium 136 134 - 144 mmol/L    Potassium 4.4 3.5 - 5.1 mmol/L    Chloride 103 98 - 107 mmol/L    Carbon Dioxide (CO2) 26 21 - 31 mmol/L    Anion Gap 7 3 - 14 mmol/L    Urea Nitrogen 12 7 - 25 mg/dL    Creatinine 0.71 0.60 - 1.20 mg/dL    Calcium 9.3 8.6 - 10.3 mg/dL    Glucose 97 70 - 105 mg/dL    GFR Estimate >90 >60 mL/min/1.73m2   Lipid Panel    Collection Time: 07/29/22 11:40 AM   Result Value Ref Range    Cholesterol 279 (H) <200 mg/dL    Triglycerides 294 (H) <150 mg/dL    Direct Measure HDL 50 23 - 92 mg/dL    LDL Cholesterol Calculated 170 (H) <=100 mg/dL    Non HDL Cholesterol 229 (H) <130 mg/dL    Patient Fasting > 8hrs? Yes    CBC with platelets and differential    Collection Time: 07/29/22 11:40 AM   Result Value Ref Range    WBC Count 11.3 (H) 4.0 - 11.0 10e3/uL    RBC Count 4.33 3.80 - 5.20 10e6/uL    Hemoglobin 11.4 (L) 11.7 - 15.7 g/dL    Hematocrit 35.6 35.0 - 47.0 %    MCV 82 78 - 100 fL    MCH 26.3 (L) 26.5 - 33.0 pg    MCHC 32.0 31.5 - 36.5 g/dL    RDW 15.5 (H) 10.0 - 15.0 %    Platelet Count 437 150 - 450 10e3/uL    % Neutrophils 70 %    % Lymphocytes 18 %    % Monocytes 9 %    % Eosinophils 2 %    % Basophils 1 %    % Immature Granulocytes 0 %    NRBCs per 100 WBC 0 <1 /100    Absolute Neutrophils 7.9 1.6 - 8.3 10e3/uL    Absolute Lymphocytes 2.0 0.8 - 5.3 10e3/uL    Absolute Monocytes 1.0 0.0 - 1.3 10e3/uL    Absolute Eosinophils 0.3 0.0 - 0.7 10e3/uL    Absolute Basophils 0.1 0.0 - 0.2 10e3/uL    Absolute Immature Granulocytes 0.0 <=0.4 10e3/uL    Absolute NRBCs 0.0 10e3/uL   Extra Blue Top Tube    Collection Time: 07/29/22 11:47 AM   Result Value Ref Range    Hold Specimen JIC       EKG: appears normal, NSR, normal axis, normal intervals, no acute ST/T changes c/w ischemia, no LVH by voltage criteria, unchanged from previous tracings    Revised Cardiac Risk Index (RCRI):  The patient has the following  serious cardiovascular risks for perioperative complications:   - No serious cardiac risks = 0 points     RCRI Interpretation: 0 points: Class I (very low risk - 0.4% complication rate)      Signed Electronically by: Ida Trejo DO  Copy of this evaluation report is provided to requesting physician.

## 2022-08-01 LAB
ATRIAL RATE - MUSE: 63 BPM
DIASTOLIC BLOOD PRESSURE - MUSE: NORMAL MMHG
INTERPRETATION ECG - MUSE: NORMAL
P AXIS - MUSE: 0 DEGREES
PR INTERVAL - MUSE: 124 MS
QRS DURATION - MUSE: 82 MS
QT - MUSE: 416 MS
QTC - MUSE: 425 MS
R AXIS - MUSE: 39 DEGREES
SYSTOLIC BLOOD PRESSURE - MUSE: NORMAL MMHG
T AXIS - MUSE: 12 DEGREES
VENTRICULAR RATE- MUSE: 63 BPM

## 2022-08-30 ENCOUNTER — TRANSFERRED RECORDS (OUTPATIENT)
Dept: HEALTH INFORMATION MANAGEMENT | Facility: OTHER | Age: 37
End: 2022-08-30

## 2022-09-17 ENCOUNTER — HEALTH MAINTENANCE LETTER (OUTPATIENT)
Age: 37
End: 2022-09-17

## 2022-09-30 ENCOUNTER — LAB (OUTPATIENT)
Dept: LAB | Facility: OTHER | Age: 37
End: 2022-09-30
Attending: INTERNAL MEDICINE
Payer: COMMERCIAL

## 2022-09-30 DIAGNOSIS — M33.20 POLYMYOSITIS (H): ICD-10-CM

## 2022-09-30 DIAGNOSIS — Z79.899 ENCOUNTER FOR LONG-TERM (CURRENT) USE OF MEDICATIONS: ICD-10-CM

## 2022-09-30 LAB
ALBUMIN SERPL-MCNC: 4.2 G/DL (ref 3.5–5.7)
ALP SERPL-CCNC: 87 U/L (ref 34–104)
ALT SERPL W P-5'-P-CCNC: 12 U/L (ref 7–52)
ANION GAP SERPL CALCULATED.3IONS-SCNC: 8 MMOL/L (ref 3–14)
AST SERPL W P-5'-P-CCNC: 13 U/L (ref 13–39)
BASOPHILS # BLD AUTO: 0.1 10E3/UL (ref 0–0.2)
BASOPHILS NFR BLD AUTO: 1 %
BILIRUB SERPL-MCNC: 0.3 MG/DL (ref 0.3–1)
BUN SERPL-MCNC: 19 MG/DL (ref 7–25)
CALCIUM SERPL-MCNC: 9.6 MG/DL (ref 8.6–10.3)
CHLORIDE BLD-SCNC: 104 MMOL/L (ref 98–107)
CO2 SERPL-SCNC: 28 MMOL/L (ref 21–31)
CREAT SERPL-MCNC: 0.82 MG/DL (ref 0.6–1.2)
CRP SERPL-MCNC: 15 MG/L
EOSINOPHIL # BLD AUTO: 0.3 10E3/UL (ref 0–0.7)
EOSINOPHIL NFR BLD AUTO: 3 %
ERYTHROCYTE [DISTWIDTH] IN BLOOD BY AUTOMATED COUNT: 15 % (ref 10–15)
ERYTHROCYTE [SEDIMENTATION RATE] IN BLOOD BY WESTERGREN METHOD: 34 MM/HR (ref 0–20)
GFR SERPL CREATININE-BSD FRML MDRD: >90 ML/MIN/1.73M2
GLUCOSE BLD-MCNC: 112 MG/DL (ref 70–105)
HCT VFR BLD AUTO: 32.1 % (ref 35–47)
HGB BLD-MCNC: 10.2 G/DL (ref 11.7–15.7)
IMM GRANULOCYTES # BLD: 0 10E3/UL
IMM GRANULOCYTES NFR BLD: 0 %
LYMPHOCYTES # BLD AUTO: 3.2 10E3/UL (ref 0.8–5.3)
LYMPHOCYTES NFR BLD AUTO: 30 %
MCH RBC QN AUTO: 25.8 PG (ref 26.5–33)
MCHC RBC AUTO-ENTMCNC: 31.8 G/DL (ref 31.5–36.5)
MCV RBC AUTO: 81 FL (ref 78–100)
MONOCYTES # BLD AUTO: 1.1 10E3/UL (ref 0–1.3)
MONOCYTES NFR BLD AUTO: 11 %
NEUTROPHILS # BLD AUTO: 6 10E3/UL (ref 1.6–8.3)
NEUTROPHILS NFR BLD AUTO: 55 %
NRBC # BLD AUTO: 0 10E3/UL
NRBC BLD AUTO-RTO: 0 /100
PLATELET # BLD AUTO: 472 10E3/UL (ref 150–450)
POTASSIUM BLD-SCNC: 3.9 MMOL/L (ref 3.5–5.1)
PROT SERPL-MCNC: 6.9 G/DL (ref 6.4–8.9)
RBC # BLD AUTO: 3.96 10E6/UL (ref 3.8–5.2)
SODIUM SERPL-SCNC: 140 MMOL/L (ref 134–144)
WBC # BLD AUTO: 10.8 10E3/UL (ref 4–11)

## 2022-09-30 PROCEDURE — 86140 C-REACTIVE PROTEIN: CPT | Mod: ZL

## 2022-09-30 PROCEDURE — 85652 RBC SED RATE AUTOMATED: CPT | Mod: ZL

## 2022-09-30 PROCEDURE — 36415 COLL VENOUS BLD VENIPUNCTURE: CPT | Mod: ZL

## 2022-09-30 PROCEDURE — 85014 HEMATOCRIT: CPT | Mod: ZL

## 2022-09-30 PROCEDURE — 80053 COMPREHEN METABOLIC PANEL: CPT | Mod: ZL

## 2022-10-05 ENCOUNTER — TRANSFERRED RECORDS (OUTPATIENT)
Dept: HEALTH INFORMATION MANAGEMENT | Facility: OTHER | Age: 37
End: 2022-10-05

## 2022-10-11 DIAGNOSIS — R06.00 DYSPNEA: Primary | ICD-10-CM

## 2022-10-16 DIAGNOSIS — F41.0 PANIC DISORDER: ICD-10-CM

## 2022-10-16 DIAGNOSIS — F41.1 GAD (GENERALIZED ANXIETY DISORDER): ICD-10-CM

## 2022-10-17 RX ORDER — CITALOPRAM HYDROBROMIDE 10 MG/1
TABLET ORAL
Qty: 90 TABLET | Refills: 0 | Status: SHIPPED | OUTPATIENT
Start: 2022-10-17 | End: 2023-04-19

## 2022-11-02 ENCOUNTER — HOSPITAL ENCOUNTER (OUTPATIENT)
Dept: CT IMAGING | Facility: OTHER | Age: 37
Discharge: HOME | End: 2022-11-02
Attending: INTERNAL MEDICINE
Payer: COMMERCIAL

## 2022-11-02 ENCOUNTER — HOSPITAL ENCOUNTER (OUTPATIENT)
Dept: RESPIRATORY THERAPY | Facility: OTHER | Age: 37
Discharge: HOME | End: 2022-11-02
Payer: COMMERCIAL

## 2022-11-02 DIAGNOSIS — M33.20 POLYMYOSITIS, ORGAN INVOLVEMENT UNSPECIFIED (H): ICD-10-CM

## 2022-11-02 DIAGNOSIS — R94.2 ABNORMAL RESULTS OF PULMONARY FUNCTION STUDIES: ICD-10-CM

## 2022-11-02 DIAGNOSIS — R06.00 DYSPNEA, UNSPECIFIED TYPE: ICD-10-CM

## 2022-11-02 DIAGNOSIS — R06.00 DYSPNEA: ICD-10-CM

## 2022-11-02 PROCEDURE — 94729 DIFFUSING CAPACITY: CPT | Mod: 26 | Performed by: INTERNAL MEDICINE

## 2022-11-02 PROCEDURE — 94726 PLETHYSMOGRAPHY LUNG VOLUMES: CPT

## 2022-11-02 PROCEDURE — 250N000009 HC RX 250

## 2022-11-02 PROCEDURE — 94726 PLETHYSMOGRAPHY LUNG VOLUMES: CPT | Mod: 26 | Performed by: INTERNAL MEDICINE

## 2022-11-02 PROCEDURE — 94640 AIRWAY INHALATION TREATMENT: CPT

## 2022-11-02 PROCEDURE — 94060 EVALUATION OF WHEEZING: CPT | Mod: 26 | Performed by: INTERNAL MEDICINE

## 2022-11-02 PROCEDURE — 999N000157 HC STATISTIC RCP TIME EA 10 MIN

## 2022-11-02 PROCEDURE — 94729 DIFFUSING CAPACITY: CPT

## 2022-11-02 PROCEDURE — 71250 CT THORAX DX C-: CPT

## 2022-11-02 PROCEDURE — 999N000105 HC STATISTIC NO DOCUMENTATION TO SUPPORT CHARGE

## 2022-11-02 PROCEDURE — 94060 EVALUATION OF WHEEZING: CPT

## 2022-11-02 RX ORDER — ALBUTEROL SULFATE 0.83 MG/ML
2.5 SOLUTION RESPIRATORY (INHALATION) ONCE
Status: COMPLETED | OUTPATIENT
Start: 2022-11-02 | End: 2022-11-02

## 2022-11-02 RX ADMIN — ALBUTEROL SULFATE 2.5 MG: 2.5 SOLUTION RESPIRATORY (INHALATION) at 14:10

## 2023-01-09 NOTE — TELEPHONE ENCOUNTER
"  Last Prescription Date: 5/12/22  Last Qty/Refills: 90 / 0  Last Office Visit: 7/29/22  Future Office Visit: 5/16/23     Requested Prescriptions   Pending Prescriptions Disp Refills     citalopram (CELEXA) 10 MG tablet [Pharmacy Med Name: Citalopram Hydrobromide 10 MG Oral Tablet] 90 tablet 0     Sig: Take 1 tablet by mouth once daily       SSRIs Protocol Passed - 10/16/2022  2:15 PM        Passed - Recent (12 mo) or future (30 days) visit within the authorizing provider's specialty     Patient has had an office visit with the authorizing provider or a provider within the authorizing providers department within the previous 12 mos or has a future within next 30 days. See \"Patient Info\" tab in inbasket, or \"Choose Columns\" in Meds & Orders section of the refill encounter.              Passed - Medication is active on med list        Passed - Patient is age 18 or older        Passed - No active pregnancy on record        Passed - No positive pregnancy test in last 12 months             "
[] : Fellow
[FreeTextEntry3] : C
[Time Spent: ___ minutes] : I have spent [unfilled] minutes of time on the encounter.
none

## 2023-04-16 DIAGNOSIS — F41.0 PANIC DISORDER: ICD-10-CM

## 2023-04-16 DIAGNOSIS — F41.1 GAD (GENERALIZED ANXIETY DISORDER): ICD-10-CM

## 2023-04-19 RX ORDER — CITALOPRAM HYDROBROMIDE 10 MG/1
TABLET ORAL
Qty: 90 TABLET | Refills: 0 | Status: SHIPPED | OUTPATIENT
Start: 2023-04-19 | End: 2023-07-25

## 2023-04-19 NOTE — TELEPHONE ENCOUNTER
Citalopram Hydrobromide 10 MG Oral Tablet        Last Written Prescription Date:  10/17/22  Last Fill Quantity: 90,   # refills: 0  Last Office Visit: 7/29/22  Future Office visit:    Next 5 appointments (look out 90 days)    May 16, 2023 11:20 AM  PHYSICAL with Jluis Berry MD  [facility] ([facility] ) [address]   [facility] [address]  [phone]           Routing refill request to provider for review/approval because:  Drug not on the [facility], P or [facility] refill protocol or controlled substance. Unable to complete prescription refill per RNMedication Refill Policy.................... Therese Spann RN ....................  4/19/2023   10:08 AM

## 2023-05-19 DIAGNOSIS — K44.9 LARGE HIATAL HERNIA: ICD-10-CM

## 2023-05-19 DIAGNOSIS — K21.00 GASTROESOPHAGEAL REFLUX DISEASE WITH ESOPHAGITIS WITHOUT HEMORRHAGE: ICD-10-CM

## 2023-05-23 RX ORDER — OMEPRAZOLE 40 MG/1
CAPSULE, DELAYED RELEASE ORAL
Qty: 90 CAPSULE | Refills: 0 | Status: SHIPPED | OUTPATIENT
Start: 2023-05-23 | End: 2023-08-30

## 2023-05-23 NOTE — TELEPHONE ENCOUNTER
[facility] sent Rx request for the following:    Omeprazole 40 MG Oral Capsule Delayed Release  Last Prescription Date:   5/12/22  Last Fill Qty/Refills:         90, R-4    Last Office Visit:              7/29/22   Future Office visit:           6/22/23    Kelley Messer RN on 5/23/2023 at 1:19 PM

## 2023-07-06 ENCOUNTER — MYC MEDICAL ADVICE (OUTPATIENT)
Dept: INTERNAL MEDICINE | Facility: OTHER | Age: 38
End: 2023-07-06
Payer: MEDICAID

## 2023-07-06 DIAGNOSIS — M33.20 POLYMYOSITIS (H): Primary | ICD-10-CM

## 2023-07-10 ENCOUNTER — TRANSFERRED RECORDS (OUTPATIENT)
Dept: HEALTH INFORMATION MANAGEMENT | Facility: OTHER | Age: 38
End: 2023-07-10
Payer: MEDICAID

## 2023-07-11 NOTE — TELEPHONE ENCOUNTER
Patient needs referral to Rheumatology per insurance. Last seen at [facility] in [place] by Dr. Zion Ash DO. Message sent to patient to verify accuracy. Kat Jimenez RN on 7/11/2023 at 8:56 AM

## 2023-07-24 DIAGNOSIS — F41.1 GAD (GENERALIZED ANXIETY DISORDER): ICD-10-CM

## 2023-07-24 DIAGNOSIS — F41.0 PANIC DISORDER: ICD-10-CM

## 2023-07-25 RX ORDER — CITALOPRAM HYDROBROMIDE 10 MG/1
TABLET ORAL
Qty: 90 TABLET | Refills: 0 | Status: SHIPPED | OUTPATIENT
Start: 2023-07-25 | End: 2023-11-02

## 2023-07-25 NOTE — TELEPHONE ENCOUNTER
"  Last Prescription Date: 4/19/2023  Last Qty/Refills: 90 / R-0  Last Office Visit: 5/12/2022  Future Office Visit: 8/17/2023    Lianet Garcia RN on 7/25/2023 at 9:28 AM     Requested Prescriptions   Pending Prescriptions Disp Refills    citalopram (CELEXA) 10 MG tablet [Pharmacy Med Name: Citalopram Hydrobromide 10 MG Oral Tablet] 90 tablet 0     Sig: Take 1 tablet by mouth once daily       SSRIs Protocol Failed - 7/25/2023  9:26 AM        Failed - Recent (12 mo) or future (30 days) visit within the authorizing provider's specialty     Patient has had an office visit with the authorizing provider or a provider within the authorizing providers department within the previous 12 mos or has a future within next 30 days. See \"Patient Info\" tab in inbasket, or \"Choose Columns\" in Meds & Orders section of the refill encounter.              "

## 2023-07-30 ENCOUNTER — HEALTH MAINTENANCE LETTER (OUTPATIENT)
Age: 38
End: 2023-07-30

## 2023-08-24 DIAGNOSIS — K44.9 LARGE HIATAL HERNIA: ICD-10-CM

## 2023-08-24 DIAGNOSIS — K21.00 GASTROESOPHAGEAL REFLUX DISEASE WITH ESOPHAGITIS WITHOUT HEMORRHAGE: ICD-10-CM

## 2023-08-30 RX ORDER — OMEPRAZOLE 40 MG/1
CAPSULE, DELAYED RELEASE ORAL
Qty: 90 CAPSULE | Refills: 0 | Status: SHIPPED | OUTPATIENT
Start: 2023-08-30 | End: 2023-12-05

## 2023-09-25 ENCOUNTER — VIRTUAL VISIT (OUTPATIENT)
Dept: FAMILY MEDICINE | Facility: OTHER | Age: 38
End: 2023-09-25
Attending: INTERNAL MEDICINE
Payer: COMMERCIAL

## 2023-09-25 DIAGNOSIS — Z20.822 EXPOSURE TO 2019 NOVEL CORONAVIRUS: Primary | ICD-10-CM

## 2023-09-25 PROCEDURE — 99213 OFFICE O/P EST LOW 20 MIN: CPT | Mod: 95

## 2023-09-25 ASSESSMENT — PATIENT HEALTH QUESTIONNAIRE - PHQ9: SUM OF ALL RESPONSES TO PHQ QUESTIONS 1-9: 0

## 2023-09-25 NOTE — NURSING NOTE
"Chief Complaint   Patient presents with    Covid Concern     Positive this morning; symptoms started yesterday        Initial Breastfeeding No  Estimated body mass index is 39.32 kg/m  as calculated from the following:    Height as of 7/29/22: 1.53 m (5' 0.25\").    Weight as of 7/29/22: 92.1 kg (203 lb).  Medication Reconciliation: complete    Lexi Buchanan CMA      FOOD SECURITY SCREENING QUESTIONS:    The next two questions are to help us understand your food security.  If you are feeling you need any assistance in this area, we have resources available to support you today.    Hunger Vital Signs:  Within the past 12 months we worried whether our food would run out before we got money to buy more. Never  Within the past 12 months the food we bought just didn't last and we didn't have money to get more. Never  Lexi Buchanan CMA,LPN on 9/25/2023 at 9:02 AM      "

## 2023-09-25 NOTE — PROGRESS NOTES
"Maria G is a 38 year old who is being evaluated via a billable telephone visit.      What phone number would you like to be contacted at? [phone]      Distant Location (provider location):  On-site    Assessment & Plan   Problem List Items Addressed This Visit    None  Visit Diagnoses       Exposure to 2019 novel coronavirus    -  Primary    Relevant Medications    nirmatrelvir and ritonavir (PAXLOVID) 300 mg/100 mg therapy pack           Patient calls for concerns of COVID-19 diagnosis.  She notes that she has underlying interstitial lung disease so would be interested in Paxlovid antiviral.  She denies any chest pain or shortness of breath.  Symptoms started on 9/24/2023 with a runny nose.  She is in no acute distress on the phone.  Medications reviewed with patient and she has no apparent medication interactions.  Renal function has been good.  Risks and benefits of medication reviewed and she would like to proceed with Paxlovid.  This was sent to the pharmacy for her.    Prescription drug management         COVID-19 positive patient.  Encounter for consideration of medication intervention. Patient does qualify for a prescription. Full discussion with patient including medication options, risks and benefits. Potential drug interactions reviewed with patient.     Treatment Planned Paxlovid    Temporary change to home medications: None   None     Estimated body mass index is 39.32 kg/m  as calculated from the following:    Height as of 7/29/22: 1.53 m (5' 0.25\").    Weight as of 7/29/22: 92.1 kg (203 lb).  GFR Estimate   Date Value Ref Range Status   09/30/2022 >90 >60 mL/min/1.73m2 Final     Comment:     Effective December 21, 2021 eGFRcr in adults is calculated using the 2021 CKD-EPI creatinine equation which includes age and gender (Isai et al., NEJM, DOI: 10.1056/FMZAgs0032419)   02/27/2018 >90 >60 mL/min/1.7m2 Final     Lab Results   Component Value Date    [id number]  02/27/2021     Test received-See " "reflex to IDDL test SARS CoV2 (COVID-19) Virus RT-PCR    [id number] NEGATIVE 02/27/2021       No follow-ups on file.    CARLITOS KEENE CNP  [facility]    Edin Cummins is a 38 year old, presenting for the following health issues:  Covid Concern (Positive this morning; symptoms started yesterday )         No data to display                HPI     Patient presents via telephone call for COVID 19 diagnosis. She notes she had a runny nose yesterday and tested positive today. She reports she has nasal congestion, no shortness of breath or CP. She does have underlying lung disease. No fevers or chills.       Objective    Vitals - Patient Reported  Weight (Patient Reported): 90.7 kg (200 lb)  Height (Patient Reported): 153 cm (5' 0.25\")  BMI (Based on Pt Reported Ht/Wt): 38.74  Pain Score: Mild Pain (3)  Pain Loc:  (Body aches; had massage yesterday)        Physical Exam   healthy, alert, and no distress  PSYCH: Alert and oriented times 3; coherent speech, normal   rate and volume, able to articulate logical thoughts, able   to abstract reason, no tangential thoughts, no hallucinations   or delusions  Her affect is normal  RESP: No cough, no audible wheezing, able to talk in full sentences  Remainder of exam unable to be completed due to telephone visits          Phone call duration: 10 minutes     "

## 2023-10-28 DIAGNOSIS — F41.0 PANIC DISORDER: ICD-10-CM

## 2023-10-28 DIAGNOSIS — F41.1 GAD (GENERALIZED ANXIETY DISORDER): ICD-10-CM

## 2023-11-01 ENCOUNTER — MYC MEDICAL ADVICE (OUTPATIENT)
Dept: INTERNAL MEDICINE | Facility: OTHER | Age: 38
End: 2023-11-01
Payer: COMMERCIAL

## 2023-11-01 NOTE — TELEPHONE ENCOUNTER
Pt is out of medication!    Requested Prescriptions   Pending Prescriptions Disp Refills    citalopram (CELEXA) 10 MG tablet [Pharmacy Med Name: Citalopram Hydrobromide 10 MG Oral Tablet] 90 tablet 0     Sig: Take 1 tablet by mouth once daily       SSRIs Protocol Failed - 10/28/2023 11:29 AM        Failed - Recent (12 mo) or future (30 days) visit within the authorizing provider's specialty     Last Prescription Date:   7/25/23  Last Fill Qty/Refills:         90, R-0    Last Office Visit:              7/29/22   Future Office visit:           None    Pt due for annual exam. Routing to provider for refill consideration. Routing to Unit scheduling pool, to assist Pt in scheduling appointment.     Unable to complete prescription refill per RN Medication Refill Policy.     Deanna Polk RN .............. 11/1/2023  4:05 PM

## 2023-11-02 RX ORDER — CITALOPRAM HYDROBROMIDE 10 MG/1
TABLET ORAL
Qty: 90 TABLET | Refills: 0 | Status: SHIPPED | OUTPATIENT
Start: 2023-11-02 | End: 2023-12-29

## 2023-11-29 DIAGNOSIS — K21.00 GASTROESOPHAGEAL REFLUX DISEASE WITH ESOPHAGITIS WITHOUT HEMORRHAGE: ICD-10-CM

## 2023-11-29 DIAGNOSIS — K44.9 LARGE HIATAL HERNIA: ICD-10-CM

## 2023-12-05 RX ORDER — OMEPRAZOLE 40 MG/1
CAPSULE, DELAYED RELEASE ORAL
Qty: 90 CAPSULE | Refills: 0 | Status: SHIPPED | OUTPATIENT
Start: 2023-12-05 | End: 2023-12-06

## 2023-12-05 NOTE — TELEPHONE ENCOUNTER
"  Last Prescription Date: 8/30/2023  Last Qty/Refills: 90 / R-0  Last Office Visit: 7/29/2022  Future Office Visit: 12/06/2023    Requested Prescriptions   Pending Prescriptions Disp Refills    omeprazole (PRILOSEC) 40 MG DR capsule [Pharmacy Med Name: Omeprazole 40 MG Oral Capsule Delayed Release] 90 capsule 0     Sig: TAKE 1 CAPSULE BY MOUTH ONCE DAILY 30  TO  60  MINUTES  PRIOR  TO  A  MEAL       PPI Protocol Failed - 12/5/2023 10:55 AM        Failed - Recent (12 mo) or future (30 days) visit within the authorizing provider's specialty     Patient has had an office visit with the authorizing provider or a provider within the authorizing providers department within the previous 12 mos or has a future within next 30 days. See \"Patient Info\" tab in inbasket, or \"Choose Columns\" in Meds & Orders section of the refill encounter.           Patient has appointment tomorrow with PCP. Will route to PCP      Lianet Garcia RN on 12/5/2023 at 11:20 AM            "

## 2023-12-06 ENCOUNTER — OFFICE VISIT (OUTPATIENT)
Dept: INTERNAL MEDICINE | Facility: OTHER | Age: 38
End: 2023-12-06
Attending: INTERNAL MEDICINE
Payer: COMMERCIAL

## 2023-12-06 VITALS
HEIGHT: 60 IN | TEMPERATURE: 96.9 F | WEIGHT: 205 LBS | RESPIRATION RATE: 16 BRPM | DIASTOLIC BLOOD PRESSURE: 80 MMHG | HEART RATE: 74 BPM | BODY MASS INDEX: 40.25 KG/M2 | SYSTOLIC BLOOD PRESSURE: 132 MMHG | OXYGEN SATURATION: 97 %

## 2023-12-06 DIAGNOSIS — F41.1 GAD (GENERALIZED ANXIETY DISORDER): ICD-10-CM

## 2023-12-06 DIAGNOSIS — N92.0 MENORRHAGIA WITH REGULAR CYCLE: ICD-10-CM

## 2023-12-06 DIAGNOSIS — M33.20 POLYMYOSITIS (H): ICD-10-CM

## 2023-12-06 DIAGNOSIS — Z79.899 CHRONIC PRESCRIPTION BENZODIAZEPINE USE: ICD-10-CM

## 2023-12-06 DIAGNOSIS — F41.0 PANIC DISORDER: ICD-10-CM

## 2023-12-06 DIAGNOSIS — K44.9 LARGE HIATAL HERNIA: ICD-10-CM

## 2023-12-06 DIAGNOSIS — R73.03 PREDIABETES: ICD-10-CM

## 2023-12-06 DIAGNOSIS — Z11.59 NEED FOR HEPATITIS C SCREENING TEST: ICD-10-CM

## 2023-12-06 DIAGNOSIS — K21.00 GASTROESOPHAGEAL REFLUX DISEASE WITH ESOPHAGITIS WITHOUT HEMORRHAGE: ICD-10-CM

## 2023-12-06 DIAGNOSIS — Z00.00 ROUTINE GENERAL MEDICAL EXAMINATION AT A HEALTH CARE FACILITY: Primary | ICD-10-CM

## 2023-12-06 DIAGNOSIS — J84.9 INTERSTITIAL LUNG DISEASE (H): ICD-10-CM

## 2023-12-06 DIAGNOSIS — D64.9 ANEMIA, UNSPECIFIED TYPE: ICD-10-CM

## 2023-12-06 DIAGNOSIS — E66.01 MORBID OBESITY (H): ICD-10-CM

## 2023-12-06 LAB
ALBUMIN SERPL BCG-MCNC: 4.6 G/DL (ref 3.5–5.2)
ALP SERPL-CCNC: 89 U/L (ref 40–150)
ALT SERPL W P-5'-P-CCNC: 12 U/L (ref 0–50)
ANION GAP SERPL CALCULATED.3IONS-SCNC: 13 MMOL/L (ref 7–15)
AST SERPL W P-5'-P-CCNC: 16 U/L (ref 0–45)
BASOPHILS # BLD AUTO: 0.1 10E3/UL (ref 0–0.2)
BASOPHILS NFR BLD AUTO: 0 %
BILIRUB SERPL-MCNC: 0.2 MG/DL
BUN SERPL-MCNC: 18.9 MG/DL (ref 6–20)
CALCIUM SERPL-MCNC: 9.8 MG/DL (ref 8.6–10)
CHLORIDE SERPL-SCNC: 100 MMOL/L (ref 98–107)
CHOLEST SERPL-MCNC: 249 MG/DL
CREAT SERPL-MCNC: 0.68 MG/DL (ref 0.51–0.95)
DEPRECATED HCO3 PLAS-SCNC: 22 MMOL/L (ref 22–29)
EGFRCR SERPLBLD CKD-EPI 2021: >90 ML/MIN/1.73M2
EOSINOPHIL # BLD AUTO: 0.3 10E3/UL (ref 0–0.7)
EOSINOPHIL NFR BLD AUTO: 2 %
ERYTHROCYTE [DISTWIDTH] IN BLOOD BY AUTOMATED COUNT: 18.5 % (ref 10–15)
FASTING STATUS PATIENT QL REPORTED: NO
GLUCOSE SERPL-MCNC: 99 MG/DL (ref 70–99)
HBA1C MFR BLD: 6.1 % (ref 4–6.2)
HCT VFR BLD AUTO: 29.8 % (ref 35–47)
HDLC SERPL-MCNC: 46 MG/DL
HGB BLD-MCNC: 9 G/DL (ref 11.7–15.7)
IMM GRANULOCYTES # BLD: 0.1 10E3/UL
IMM GRANULOCYTES NFR BLD: 0 %
LDLC SERPL CALC-MCNC: 163 MG/DL
LYMPHOCYTES # BLD AUTO: 3.8 10E3/UL (ref 0.8–5.3)
LYMPHOCYTES NFR BLD AUTO: 27 %
MCH RBC QN AUTO: 21.4 PG (ref 26.5–33)
MCHC RBC AUTO-ENTMCNC: 30.2 G/DL (ref 31.5–36.5)
MCV RBC AUTO: 71 FL (ref 78–100)
MONOCYTES # BLD AUTO: 1.3 10E3/UL (ref 0–1.3)
MONOCYTES NFR BLD AUTO: 9 %
NEUTROPHILS # BLD AUTO: 8.8 10E3/UL (ref 1.6–8.3)
NEUTROPHILS NFR BLD AUTO: 62 %
NONHDLC SERPL-MCNC: 203 MG/DL
NRBC # BLD AUTO: 0 10E3/UL
NRBC BLD AUTO-RTO: 0 /100
PLATELET # BLD AUTO: 551 10E3/UL (ref 150–450)
POTASSIUM SERPL-SCNC: 3.8 MMOL/L (ref 3.4–5.3)
PROT SERPL-MCNC: 7.9 G/DL (ref 6.4–8.3)
RBC # BLD AUTO: 4.21 10E6/UL (ref 3.8–5.2)
SODIUM SERPL-SCNC: 135 MMOL/L (ref 135–145)
TRIGL SERPL-MCNC: 199 MG/DL
TSH SERPL DL<=0.005 MIU/L-ACNC: 1.45 UIU/ML (ref 0.3–4.2)
WBC # BLD AUTO: 14.2 10E3/UL (ref 4–11)

## 2023-12-06 PROCEDURE — 99385 PREV VISIT NEW AGE 18-39: CPT

## 2023-12-06 PROCEDURE — 83036 HEMOGLOBIN GLYCOSYLATED A1C: CPT | Mod: ZL

## 2023-12-06 PROCEDURE — 36415 COLL VENOUS BLD VENIPUNCTURE: CPT | Mod: ZL

## 2023-12-06 PROCEDURE — 87624 HPV HI-RISK TYP POOLED RSLT: CPT | Mod: ZL

## 2023-12-06 PROCEDURE — 82728 ASSAY OF FERRITIN: CPT | Mod: ZL

## 2023-12-06 PROCEDURE — 85004 AUTOMATED DIFF WBC COUNT: CPT | Mod: ZL

## 2023-12-06 PROCEDURE — G0463 HOSPITAL OUTPT CLINIC VISIT: HCPCS

## 2023-12-06 PROCEDURE — G0123 SCREEN CERV/VAG THIN LAYER: HCPCS

## 2023-12-06 PROCEDURE — 86803 HEPATITIS C AB TEST: CPT | Mod: ZL

## 2023-12-06 PROCEDURE — 80053 COMPREHEN METABOLIC PANEL: CPT | Mod: ZL

## 2023-12-06 PROCEDURE — 83550 IRON BINDING TEST: CPT | Mod: ZL

## 2023-12-06 PROCEDURE — 84443 ASSAY THYROID STIM HORMONE: CPT | Mod: ZL

## 2023-12-06 PROCEDURE — 80061 LIPID PANEL: CPT | Mod: ZL

## 2023-12-06 PROCEDURE — 99214 OFFICE O/P EST MOD 30 MIN: CPT | Mod: 25

## 2023-12-06 RX ORDER — PREDNISONE 2.5 MG/1
2.5 TABLET ORAL DAILY
Qty: 30 TABLET | Refills: 1 | Status: SHIPPED | OUTPATIENT
Start: 2023-12-06

## 2023-12-06 RX ORDER — CITALOPRAM HYDROBROMIDE 10 MG/1
10 TABLET ORAL DAILY
Qty: 90 TABLET | Refills: 0 | Status: CANCELLED | OUTPATIENT
Start: 2023-12-06

## 2023-12-06 RX ORDER — CITALOPRAM HYDROBROMIDE 20 MG/1
20 TABLET ORAL DAILY
Qty: 90 TABLET | Refills: 4 | Status: SHIPPED | OUTPATIENT
Start: 2023-12-06

## 2023-12-06 RX ORDER — OMEPRAZOLE 40 MG/1
CAPSULE, DELAYED RELEASE ORAL
Qty: 90 CAPSULE | Refills: 4 | Status: SHIPPED | OUTPATIENT
Start: 2023-12-06 | End: 2023-12-29

## 2023-12-06 ASSESSMENT — ENCOUNTER SYMPTOMS
SHORTNESS OF BREATH: 0
HEMATURIA: 0
DIZZINESS: 0
ARTHRALGIAS: 0
PALPITATIONS: 0
EYE PAIN: 0
NERVOUS/ANXIOUS: 1
MYALGIAS: 1
WEAKNESS: 0
FEVER: 0
CONSTIPATION: 0
NAUSEA: 0
DYSURIA: 0
DIARRHEA: 0
ABDOMINAL PAIN: 0
JOINT SWELLING: 0
HEMATOCHEZIA: 0
HEARTBURN: 0
FREQUENCY: 0
COUGH: 0
SORE THROAT: 0
HEADACHES: 1
PARESTHESIAS: 0
CHILLS: 0

## 2023-12-06 ASSESSMENT — PAIN SCALES - GENERAL: PAINLEVEL: NO PAIN (0)

## 2023-12-06 ASSESSMENT — ANXIETY QUESTIONNAIRES
7. FEELING AFRAID AS IF SOMETHING AWFUL MIGHT HAPPEN: SEVERAL DAYS
5. BEING SO RESTLESS THAT IT IS HARD TO SIT STILL: NOT AT ALL
GAD7 TOTAL SCORE: 7
4. TROUBLE RELAXING: SEVERAL DAYS
1. FEELING NERVOUS, ANXIOUS, OR ON EDGE: SEVERAL DAYS
6. BECOMING EASILY ANNOYED OR IRRITABLE: MORE THAN HALF THE DAYS
3. WORRYING TOO MUCH ABOUT DIFFERENT THINGS: SEVERAL DAYS
IF YOU CHECKED OFF ANY PROBLEMS ON THIS QUESTIONNAIRE, HOW DIFFICULT HAVE THESE PROBLEMS MADE IT FOR YOU TO DO YOUR WORK, TAKE CARE OF THINGS AT HOME, OR GET ALONG WITH OTHER PEOPLE: VERY DIFFICULT
2. NOT BEING ABLE TO STOP OR CONTROL WORRYING: SEVERAL DAYS
GAD7 TOTAL SCORE: 7

## 2023-12-06 ASSESSMENT — PATIENT HEALTH QUESTIONNAIRE - PHQ9
10. IF YOU CHECKED OFF ANY PROBLEMS, HOW DIFFICULT HAVE THESE PROBLEMS MADE IT FOR YOU TO DO YOUR WORK, TAKE CARE OF THINGS AT HOME, OR GET ALONG WITH OTHER PEOPLE: SOMEWHAT DIFFICULT
SUM OF ALL RESPONSES TO PHQ QUESTIONS 1-9: 6
SUM OF ALL RESPONSES TO PHQ QUESTIONS 1-9: 6

## 2023-12-06 NOTE — PROGRESS NOTES
SUBJECTIVE:   Maria G is a 38 year old, presenting for the following:  Physical and Recheck Medication      Patient presents to clinic for annual physical visit.  She would like to discuss options for weight loss.  She has a history of polymyositis-she follows up with rheumatology on a yearly basis.  She had a significant amount of weight gain while on prednisone.  She is no longer on prednisone, she is taking omeprazole on a daily basis.  She reports that her surgeon will not perform hiatal hernia surgery until she is down to 150 pounds.    Additionally, patient reports that she continues to have heavy menstrual bleeding.  She has followed up regarding this in the past, was recommended to proceed with a gynecological procedure but has not followed up on this.    Healthy Habits:     Getting at least 3 servings of Calcium per day:  Yes    Bi-annual eye exam:  NO    Dental care twice a year:  NO    Sleep apnea or symptoms of sleep apnea:  Sleep apnea    Diet:  Other    Frequency of exercise:  1 day/week    Duration of exercise:  Less than 15 minutes    Taking medications regularly:  Yes    Medication side effects:  None    Additional concerns today:  No      Have you ever done Advance Care Planning? (For example, a Health Directive, POLST, or a discussion with a medical provider or your loved ones about your wishes): No, advance care planning information given to patient to review.  Patient plans to discuss their wishes with loved ones or provider.      Social History     Tobacco Use    Smoking status: Former     Packs/day: 1.00     Years: 15.00     Additional pack years: 0.00     Total pack years: 15.00     Types: Cigarettes     Quit date: 4/4/2013     Years since quitting: 10.6    Smokeless tobacco: Never    Tobacco comments:     Quit smoking: information provided on NRT   Substance Use Topics    Alcohol use: Yes     Comment: <Maybe once a year         12/6/2023     3:53 PM   Alcohol Use   Prescreen: >3 drinks/day  "or >7 drinks/week? No     Reviewed orders with patient.  Reviewed health maintenance and updated orders accordingly - Yes      Breast Cancer Screening:  Any new diagnosis of family breast, ovarian, or bowel cancer? No        Patient under 40 years of age: Routine Mammogram Screening not recommended.   Pertinent mammograms are reviewed under the imaging tab.    History of abnormal Pap smear: NO - age 30-65 PAP every 5 years with negative HPV co-testing recommended     Reviewed and updated as needed this visit by clinical staff   Tobacco  Allergies  Meds              Reviewed and updated as needed this visit by Provider                     Review of Systems   Constitutional:  Negative for chills and fever.   HENT:  Negative for congestion, ear pain and sore throat.    Eyes:  Negative for pain and visual disturbance.   Respiratory:  Negative for cough and shortness of breath.    Cardiovascular:  Negative for chest pain, palpitations and peripheral edema.   Gastrointestinal:  Negative for abdominal pain, constipation, diarrhea, heartburn, hematochezia and nausea.   Genitourinary:  Positive for vaginal bleeding. Negative for dysuria, frequency, genital sores, hematuria and urgency.   Musculoskeletal:  Positive for myalgias. Negative for arthralgias and joint swelling.   Skin:  Negative for rash.   Neurological:  Positive for headaches. Negative for dizziness, weakness and paresthesias.   Psychiatric/Behavioral:  Positive for mood changes. The patient is nervous/anxious.         OBJECTIVE:   /80 (BP Location: Right arm, Patient Position: Sitting, Cuff Size: Adult Large)   Pulse 74   Temp 96.9  F (36.1  C) (Temporal)   Resp 16   Ht 1.511 m (4' 11.5\")   Wt 93 kg (205 lb)   LMP 12/04/2023 (Exact Date)   SpO2 97%   BMI 40.71 kg/m    Physical Exam  Constitutional:       General: She is not in acute distress.     Appearance: She is well-developed. She is obese. She is not diaphoretic.   HENT:      Head: " Normocephalic and atraumatic.   Eyes:      General: No scleral icterus.     Conjunctiva/sclera: Conjunctivae normal.   Cardiovascular:      Rate and Rhythm: Normal rate and regular rhythm.   Pulmonary:      Effort: Pulmonary effort is normal.      Breath sounds: Normal breath sounds.   Abdominal:      Palpations: Abdomen is soft.      Tenderness: There is no abdominal tenderness.   Genitourinary:     General: Normal vulva.      Vagina: Normal.      Comments: Patient currently on menstrual cycle- small amount of clots noted. Pap collected.  Musculoskeletal:         General: No deformity.      Cervical back: Neck supple.   Lymphadenopathy:      Cervical: No cervical adenopathy.   Skin:     General: Skin is warm and dry.      Coloration: Skin is not jaundiced.      Findings: No rash.   Neurological:      Mental Status: She is alert and oriented to person, place, and time. Mental status is at baseline.   Psychiatric:         Mood and Affect: Mood normal.         Behavior: Behavior normal.           Diagnostic Test Results:  Labs reviewed in Epic  Results for orders placed or performed in visit on 12/06/23 (from the past 24 hour(s))   CBC and Differential    Narrative    The following orders were created for panel order CBC and Differential.  Procedure                               Abnormality         Status                     ---------                               -----------         ------                     CBC with platelets and d...[[id number]]  Abnormal            Final result                 Please view results for these tests on the individual orders.   Comprehensive Metabolic Panel   Result Value Ref Range    Sodium 135 135 - 145 mmol/L    Potassium 3.8 3.4 - 5.3 mmol/L    Carbon Dioxide (CO2) 22 22 - 29 mmol/L    Anion Gap 13 7 - 15 mmol/L    Urea Nitrogen 18.9 6.0 - 20.0 mg/dL    Creatinine 0.68 0.51 - 0.95 mg/dL    GFR Estimate >90 >60 mL/min/1.73m2    Calcium 9.8 8.6 - 10.0 mg/dL    Chloride 100 98 - 107  mmol/L    Glucose 99 70 - 99 mg/dL    Alkaline Phosphatase 89 40 - 150 U/L    AST 16 0 - 45 U/L    ALT 12 0 - 50 U/L    Protein Total 7.9 6.4 - 8.3 g/dL    Albumin 4.6 3.5 - 5.2 g/dL    Bilirubin Total 0.2 <=1.2 mg/dL   Hemoglobin A1c   Result Value Ref Range    Hemoglobin A1C 6.1 4.0 - 6.2 %   Lipid Panel   Result Value Ref Range    Cholesterol 249 (H) <200 mg/dL    Triglycerides 199 (H) <150 mg/dL    Direct Measure HDL 46 (L) >=50 mg/dL    LDL Cholesterol Calculated 163 (H) <=100 mg/dL    Non HDL Cholesterol 203 (H) <130 mg/dL    Patient Fasting > 8hrs? No     Narrative    Cholesterol  Desirable:  <200 mg/dL    Triglycerides  Normal:  Less than 150 mg/dL  Borderline High:  150-199 mg/dL  High:  200-499 mg/dL  Very High:  Greater than or equal to 500 mg/dL    Direct Measure HDL  Female:  Greater than or equal to 50 mg/dL   Male:  Greater than or equal to 40 mg/dL    LDL Cholesterol  Desirable:  <100mg/dL  Above Desirable:  100-129 mg/dL   Borderline High:  130-159 mg/dL   High:  160-189 mg/dL   Very High:  >= 190 mg/dL    Non HDL Cholesterol  Desirable:  130 mg/dL  Above Desirable:  130-159 mg/dL  Borderline High:  160-189 mg/dL  High:  190-219 mg/dL  Very High:  Greater than or equal to 220 mg/dL   TSH Reflex GH   Result Value Ref Range    TSH 1.45 0.30 - 4.20 uIU/mL   CBC with platelets and differential   Result Value Ref Range    WBC Count 14.2 (H) 4.0 - 11.0 10e3/uL    RBC Count 4.21 3.80 - 5.20 10e6/uL    Hemoglobin 9.0 (L) 11.7 - 15.7 g/dL    Hematocrit 29.8 (L) 35.0 - 47.0 %    MCV 71 (L) 78 - 100 fL    MCH 21.4 (L) 26.5 - 33.0 pg    MCHC 30.2 (L) 31.5 - 36.5 g/dL    RDW 18.5 (H) 10.0 - 15.0 %    Platelet Count 551 (H) 150 - 450 10e3/uL    % Neutrophils 62 %    % Lymphocytes 27 %    % Monocytes 9 %    % Eosinophils 2 %    % Basophils 0 %    % Immature Granulocytes 0 %    NRBCs per 100 WBC 0 <1 /100    Absolute Neutrophils 8.8 (H) 1.6 - 8.3 10e3/uL    Absolute Lymphocytes 3.8 0.8 - 5.3 10e3/uL    Absolute  Monocytes 1.3 0.0 - 1.3 10e3/uL    Absolute Eosinophils 0.3 0.0 - 0.7 10e3/uL    Absolute Basophils 0.1 0.0 - 0.2 10e3/uL    Absolute Immature Granulocytes 0.1 <=0.4 10e3/uL    Absolute NRBCs 0.0 10e3/uL       ASSESSMENT/PLAN:         ICD-10-CM    1. Routine general medical examination at a health care facility  Z00.00 PAP screen with HPV - recommended age 30 - 65 years     CBC and Differential     Comprehensive Metabolic Panel     Hemoglobin A1c     Lipid Panel     TSH Reflex GH     CBC and Differential     Comprehensive Metabolic Panel     Hemoglobin A1c     Lipid Panel     TSH Reflex GH     HPV High Risk Types DNA Cervical      2. VICKY (generalized anxiety disorder)  F41.1 citalopram (CELEXA) 20 MG tablet      3. Panic disorder  F41.0       4. Gastroesophageal reflux disease with esophagitis without hemorrhage  K21.00 omeprazole (PRILOSEC) 40 MG DR capsule      5. Large hiatal hernia  K44.9 omeprazole (PRILOSEC) 40 MG DR capsule      6. Interstitial lung disease (H)  J84.9 predniSONE (DELTASONE) 2.5 MG tablet      7. Morbid obesity (H)  E66.01 Semaglutide-Weight Management (WEGOVY) 0.25 MG/0.5ML pen      8. Menorrhagia with regular cycle  N92.0 Ob/Gyn  Referral      9. Chronic prescription benzodiazepine use  Z79.899       10. Need for hepatitis C screening test  Z11.59 Hepatitis C Screen Reflex to HCV RNA Quant and Genotype     Hepatitis C Screen Reflex to HCV RNA Quant and Genotype      11. Polymyositis (H)  M33.20       12. Anemia, unspecified type  D64.9 Ferritin     Iron & Iron Binding Capacity          Anemia- Chronic, ongoing, patient had GI side effects to iron supplementation, hemoglobin has continued to drop.  Patient is otherwise asymptomatic-strongly recommended that patient follow-up with gynecology as she has heavy menstrual bleeding, states to pass occasional clots. Will offer iron infusion if patient does not want to attempt oral iron supplementation again.    Large hiatal hernia-we will  continue to work on weight loss so that she can get approved for surgery, discussed risks of taking omeprazole chronically.  Prescription for Wegovy sent to pharmacy, provided her with information on other medication options if she is unable to get this covered by insurance.  Patient will continue to work on diet and exercise.    Anxiety-patient would like to increase Celexa dose, currently she is taking 10 mg daily-we will increase this to 20 mg daily.      Return in about 1 year (around 12/6/2024) for Preventive Visit.    CARLITOS Lyon CNP  [facility]    COUNSELING:  Reviewed preventive health counseling, as reflected in patient instructions  Special attention given to:        Regular exercise       Healthy diet/nutrition       Vision screening       Contraception       Consider Hep C screening for all patients one time for ages 18-79 years        She reports that she quit smoking about 10 years ago. Her smoking use included cigarettes. She has a 15.00 pack-year smoking history. She has never used smokeless tobacco.          CARLITOS Lyon CNP  [facility]

## 2023-12-06 NOTE — LETTER
December 7, 2023      Maria G Haley  [address] [facility] [address]        Dear MsLorettaTera,    We are writing to inform you of your test results.    {results letter list:[id number]}    No results found from the In Basket message.    If you have any questions or concerns, please call the clinic at the number listed above.       Sincerely,

## 2023-12-06 NOTE — LETTER
My Depression Action Plan  Name: Maria G Haley   Date of Birth 1985  Date: 12/6/2023    My doctor: Jluis Berry   My clinic: [facility]  [address]  [place] [id number] [phone]            GREEN    ZONE   Good Control    What it looks like:   Things are going generally well. You have normal ups and downs. You may even feel depressed from time to time, but bad moods usually last less than a day.   What you need to do:  Continue to care for yourself (see self care plan)  Check your depression survival kit and update it as needed  Follow your physician s recommendations including any medication.  Do not stop taking medication unless you consult with your physician first.             YELLOW         ZONE Getting Worse    What it looks like:   Depression is starting to interfere with your life.   It may be hard to get out of bed; you may be starting to isolate yourself from others.  Symptoms of depression are starting to last most all day and this has happened for several days.   You may have suicidal thoughts but they are not constant.   What you need to do:     Call your care team. Your response to treatment will improve if you keep your care team informed of your progress. Yellow periods are signs an adjustment may need to be made.     Continue your self-care.  Just get dressed and ready for the day.  Don't give yourself time to talk yourself out of it.    Talk to someone in your support network.    Open up your Depression Self-Care Plan/Wellness Kit.             RED    ZONE Medical Alert - Get Help    What it looks like:   Depression is seriously interfering with your life.   You may experience these or other symptoms: You can t get out of bed most days, can t work or engage in other necessary activities, you have trouble taking care of basic hygiene, or basic responsibilities, thoughts of suicide or death that will not go away, self-injurious behavior.      What you need to do:  Call your care team and request a same-day appointment. If they are not available (weekends or after hours) call your local crisis line, emergency room or 911.          Depression Self-Care Plan / Wellness Kit    Many people find that medication and therapy are helpful treatments for managing depression. In addition, making small changes to your everyday life can help to boost your mood and improve your wellbeing. Below are some tips for you to consider. Be sure to talk with your medical provider and/or behavioral health consultant if your symptoms are worsening or not improving.     Sleep   Sleep hygiene  means all of the habits that support good, restful sleep. It includes maintaining a consistent bedtime and wake time, using your bedroom only for sleeping or sex, and keeping the bedroom dark and free of distractions like a computer, smartphone, or television.     Develop a Healthy Routine  Maintain good hygiene. Get out of bed in the morning, make your bed, brush your teeth, take a shower, and get dressed. Don t spend too much time viewing media that makes you feel stressed. Find time to relax each day.    Exercise  Get some form of exercise every day. This will help reduce pain and release endorphins, the  feel good  chemicals in your brain. It can be as simple as just going for a walk or doing some gardening, anything that will get you moving.      Diet  Strive to eat healthy foods, including fruits and vegetables. Drink plenty of water. Avoid excessive sugar, caffeine, alcohol, and other mood-altering substances.     Stay Connected with Others  Stay in touch with friends and family members.    Manage Your Mood  Try deep breathing, massage therapy, biofeedback, or meditation. Take part in fun activities when you can. Try to find something to smile about each day.     Psychotherapy  Be open to working with a therapist if your provider recommends it.     Medication  Be sure to take your  medication as prescribed. Most anti-depressants need to be taken every day. It usually takes several weeks for medications to work. Not all medicines work for all people. It is important to follow-up with your provider to make sure you have a treatment plan that is working for you. Do not stop your medication abruptly without first discussing it with your provider.    Crisis Resources   These hotlines are for both adults and children. They and are open 24 hours a day, 7 days a week unless noted otherwise.    National Suicide Prevention Lifeline   988 or 3-104-154-UGZX (9839)    Crisis Text Line    www.crisistextline.org  Text HOME to 021617 from anywhere in the [place], anytime, about any type of crisis. A live, trained crisis counselor will receive the text and respond quickly.    [place] Lifeline for LGBTQ Youth  A national crisis intervention and suicide lifeline for LGBTQ youth under 25. Provides a safe place to talk without judgement. Call 1-800.936.3746; text START to 622539 or visit www.theSprinkleBitvorproject.org to talk to a trained counselor.    For Count includes the [facility] crisis numbers, visit the [facility] website at:  https://[place].gov/dhs/people-we-serve/adults/health-care/mental-health/resources/crisis-contacts.jsp

## 2023-12-06 NOTE — NURSING NOTE
"Chief Complaint   Patient presents with    Physical    Recheck Medication     Patient presents to the clinic today for a physical  Initial There were no vitals taken for this visit. Estimated body mass index is 39.32 kg/m  as calculated from the following:    Height as of 7/29/22: 1.53 m (5' 0.25\").    Weight as of 7/29/22: 92.1 kg (203 lb).  Meds Reconciled: complete      Charlette Jurado LPN,LPN on 12/6/2023 at 3:53 PM  Ext. [phone]        Charlette Jurado LPN  "

## 2023-12-07 DIAGNOSIS — N92.0 MENORRHAGIA WITH REGULAR CYCLE: Primary | ICD-10-CM

## 2023-12-07 LAB
FERRITIN SERPL-MCNC: 9 NG/ML (ref 6–175)
IRON BINDING CAPACITY (ROCHE): 442 UG/DL (ref 240–430)
IRON SATN MFR SERPL: 4 % (ref 15–46)
IRON SERPL-MCNC: 18 UG/DL (ref 37–145)

## 2023-12-08 LAB — HCV AB SERPL QL IA: NONREACTIVE

## 2023-12-13 LAB
HUMAN PAPILLOMA VIRUS 16 DNA: NEGATIVE
HUMAN PAPILLOMA VIRUS 18 DNA: NEGATIVE
HUMAN PAPILLOMA VIRUS FINAL DIAGNOSIS: NORMAL
HUMAN PAPILLOMA VIRUS OTHER HR: NEGATIVE

## 2023-12-14 LAB
BKR LAB AP GYN ADEQUACY: NORMAL
BKR LAB AP GYN INTERPRETATION: NORMAL
BKR LAB AP GYN OTHER FINDINGS: NORMAL
BKR LAB AP HPV REFLEX: NORMAL
BKR LAB AP LMP: NORMAL
BKR LAB AP PREVIOUS ABNORMAL: NORMAL
PATH REPORT.COMMENTS IMP SPEC: NORMAL
PATH REPORT.COMMENTS IMP SPEC: NORMAL
PATH REPORT.RELEVANT HX SPEC: NORMAL

## 2023-12-28 ENCOUNTER — E-VISIT (OUTPATIENT)
Dept: INTERNAL MEDICINE | Facility: OTHER | Age: 38
End: 2023-12-28
Payer: COMMERCIAL

## 2023-12-28 ENCOUNTER — MYC MEDICAL ADVICE (OUTPATIENT)
Dept: INTERNAL MEDICINE | Facility: OTHER | Age: 38
End: 2023-12-28
Payer: COMMERCIAL

## 2023-12-28 DIAGNOSIS — J06.9 VIRAL URI WITH COUGH: Primary | ICD-10-CM

## 2023-12-28 DIAGNOSIS — J21.9 ACUTE BRONCHIOLITIS, UNSPECIFIED: ICD-10-CM

## 2023-12-28 NOTE — TELEPHONE ENCOUNTER
Called to get patient set up with an appointment. Patient cannot wait until next week and wants to see if Kristi will fit her in tomorrow.     Will route to Kristi to address.     Tami Frederick RN on 12/28/2023 at 3:58 PM

## 2023-12-28 NOTE — PATIENT INSTRUCTIONS
Dear Maria G Haley,    We are sorry you are not feeling well. Based on the responses you provided, it is recommended that you be seen in-person in urgent care so we can better evaluate your symptoms. Please click here to find the nearest urgent care location to you.   You will not be charged for this Visit. Thank you for trusting us with your care.    CARLITOS Lyon CNP    Dear Maria G,    We are sorry you are not feeling well. Based on the responses you provided, it is recommended that you be seen in-person in clinic so we can better evaluate your symptoms. Please schedule this visit in [employer]. You will have a Schedule Now button in [employer] to help with scheduling this appointment. Otherwise, you can call [phone] to schedule an appointment.     You will not be charged for this eVisit. Thank you for trusting us with your care.     CARLITOS Lyon CNP

## 2023-12-29 ENCOUNTER — OFFICE VISIT (OUTPATIENT)
Dept: INTERNAL MEDICINE | Facility: OTHER | Age: 38
End: 2023-12-29
Attending: INTERNAL MEDICINE
Payer: COMMERCIAL

## 2023-12-29 VITALS
WEIGHT: 202.6 LBS | RESPIRATION RATE: 16 BRPM | OXYGEN SATURATION: 97 % | SYSTOLIC BLOOD PRESSURE: 128 MMHG | HEIGHT: 61 IN | TEMPERATURE: 98.2 F | HEART RATE: 91 BPM | DIASTOLIC BLOOD PRESSURE: 74 MMHG | BODY MASS INDEX: 38.25 KG/M2

## 2023-12-29 DIAGNOSIS — J84.9 INTERSTITIAL LUNG DISEASE (H): ICD-10-CM

## 2023-12-29 DIAGNOSIS — K44.9 LARGE HIATAL HERNIA: ICD-10-CM

## 2023-12-29 DIAGNOSIS — E66.01 CLASS 2 SEVERE OBESITY DUE TO EXCESS CALORIES WITH SERIOUS COMORBIDITY AND BODY MASS INDEX (BMI) OF 38.0 TO 38.9 IN ADULT (H): ICD-10-CM

## 2023-12-29 DIAGNOSIS — E66.812 CLASS 2 SEVERE OBESITY DUE TO EXCESS CALORIES WITH SERIOUS COMORBIDITY AND BODY MASS INDEX (BMI) OF 38.0 TO 38.9 IN ADULT (H): ICD-10-CM

## 2023-12-29 DIAGNOSIS — M33.20 POLYMYOSITIS (H): ICD-10-CM

## 2023-12-29 DIAGNOSIS — K21.00 GASTROESOPHAGEAL REFLUX DISEASE WITH ESOPHAGITIS WITHOUT HEMORRHAGE: ICD-10-CM

## 2023-12-29 DIAGNOSIS — R05.8 COUGH PRODUCTIVE OF PURULENT SPUTUM: Primary | ICD-10-CM

## 2023-12-29 PROCEDURE — G0463 HOSPITAL OUTPT CLINIC VISIT: HCPCS

## 2023-12-29 PROCEDURE — 99214 OFFICE O/P EST MOD 30 MIN: CPT | Performed by: INTERNAL MEDICINE

## 2023-12-29 RX ORDER — DOXYCYCLINE 100 MG/1
100 CAPSULE ORAL 2 TIMES DAILY
Qty: 20 CAPSULE | Refills: 0 | Status: SHIPPED | OUTPATIENT
Start: 2023-12-29 | End: 2024-01-08

## 2023-12-29 RX ORDER — BENZONATATE 200 MG/1
200 CAPSULE ORAL 3 TIMES DAILY PRN
Qty: 90 CAPSULE | Refills: 1 | Status: SHIPPED | OUTPATIENT
Start: 2023-12-29

## 2023-12-29 RX ORDER — FAMOTIDINE 20 MG/1
20 TABLET, FILM COATED ORAL 2 TIMES DAILY
Qty: 180 TABLET | Refills: 4 | Status: SHIPPED | OUTPATIENT
Start: 2023-12-29

## 2023-12-29 ASSESSMENT — PATIENT HEALTH QUESTIONNAIRE - PHQ9
SUM OF ALL RESPONSES TO PHQ QUESTIONS 1-9: 1
10. IF YOU CHECKED OFF ANY PROBLEMS, HOW DIFFICULT HAVE THESE PROBLEMS MADE IT FOR YOU TO DO YOUR WORK, TAKE CARE OF THINGS AT HOME, OR GET ALONG WITH OTHER PEOPLE: SOMEWHAT DIFFICULT
SUM OF ALL RESPONSES TO PHQ QUESTIONS 1-9: 1

## 2023-12-29 ASSESSMENT — ENCOUNTER SYMPTOMS
SORE THROAT: 1
FEVER: 1
FATIGUE: 1
CHILLS: 1
RHINORRHEA: 1
SHORTNESS OF BREATH: 1
SINUS PAIN: 1
WHEEZING: 1
COUGH: 1
ROS GI COMMENTS: + REFLUX
SINUS PRESSURE: 1

## 2023-12-29 ASSESSMENT — PAIN SCALES - GENERAL: PAINLEVEL: MILD PAIN (3)

## 2023-12-29 NOTE — PROGRESS NOTES
Assessment & Plan     ICD-10-CM    1. Cough productive of purulent sputum  R05.8 doxycycline hyclate (VIBRAMYCIN) 100 MG capsule     benzonatate (TESSALON) 200 MG capsule      2. Interstitial lung disease (H)  J84.9       3. Polymyositis (H)  M33.20       4. Class 2 severe obesity due to excess calories with serious comorbidity and body mass index (BMI) of 38.0 to 38.9 in adult (H)  E66.01     Z68.38       5. Gastroesophageal reflux disease with esophagitis without hemorrhage  K21.00 omeprazole (PRILOSEC) 20 MG DR capsule     famotidine (PEPCID) 20 MG tablet      6. Large hiatal hernia  K44.9 omeprazole (PRILOSEC) 20 MG DR capsule     famotidine (PEPCID) 20 MG tablet        Patient presents for follow-up multiple issues.    Has been having productive cough with purulent phlegm.  Initially woke up December 23 and was quite sick that evening into the next day.  She actually felt quite a bit better the evening of the 24th and throughout the whole day on the 25th.  She woke up again sick on 1226 with green phlegm.  27th felt fine.  28th and 29th has not been sick once again with cough, purulent phlegm.    3 covid tests negative at home.  - everyone with green nasal phlegm lately.     Patient has interstitial lung disease and has been put on doxycycline intermittently in the past for respiratory infection.  She has prednisone at home and was also taking CellCept for a while due to her polymyositis.  No longer immunocompromised.  She is not currently taking prednisone or CellCept.    Prescription for doxycycline and Tessalon Perles sent to pharmacy.    Patient continues to work on managing her heartburn.  States that she had workup through [facility] and was about to go ahead with surgery but was denied due to her BMI of 38.  At this time she is still having ongoing issues with heartburn and reflux.  Recommend trying to reduce omeprazole dose if possible.  Start famotidine once or twice daily, scheduled  dosing if needed.  Recommend follow-up with surgery clinic to discuss possible hiatal hernia repair surgery.  She is wanting to get a second opinion, now that her initial surgeon declined hiatal hernia repair.    OBESITY - Ongoing.  (See Encounter Diagnosis list above for obesity class / severity).  - Counseled on diet and exercise.   - Recommend Nutrition / Dietician appointment.  - Weight loss would improve Cholesterol.      Return for follow-up as needed for new or worsening symptoms, Appointments: [phone].    Jluis Berry MD  [facility] [place]     Edin Cummins is a 38 year old, presenting for the following health issues:  Possible respiratory infection / interstitial diseasee        12/29/2023    11:49 AM   Additional Questions   Roomed by Loretta Hearn/ PATRICIA   Accompanied by n/a       History of Present Illness       Reason for visit:  Upper respiratory illness  Symptom onset:  3-7 days ago  Symptoms include:  Cough, head ache, green phlegm, congestion  Symptom intensity:  Moderate  Symptom progression:  Staying the same  Had these symptoms before:  Yes  Has tried/received treatment for these symptoms:  Yes  Previous treatment was successful:  Yes  Prior treatment description:  Doxyline    She eats 2-3 servings of fruits and vegetables daily.She consumes 1 sweetened beverage(s) daily.She exercises with enough effort to increase her heart rate 9 or less minutes per day.  She exercises with enough effort to increase her heart rate 3 or less days per week.   She is taking medications regularly.       Acute Illness  Acute illness concerns: respiratory infection  Onset/Duration: 5 days ago  Symptoms:  Fever: YES  99.8  Chills/Sweats: YES  Headache (location?): YES- back and top  Sinus Pressure: YES  Conjunctivitis:  No  Ear Pain: YES: bilateral  Rhinorrhea: No  Congestion: YES  Sore Throat: No  Cough: YES-productive of yellow sputum, with shortness of breath, worse  Wheeze: YES  Decreased  "Appetite: No  Nausea: No  Vomiting: No  Diarrhea: No  Dysuria/Freq.: No  Dysuria or Hematuria: No  Fatigue/Achiness:no  Sick/Strep Exposure: YES  Therapies tried and outcome: mucinex, hot tea      Review of Systems   Constitutional:  Positive for chills, fatigue and fever.   HENT:  Positive for congestion, postnasal drip, rhinorrhea, sinus pressure, sinus pain and sore throat.    Respiratory:  Positive for cough, shortness of breath and wheezing.    Gastrointestinal:         + Reflux   Allergic/Immunologic: Negative for immunocompromised state.            Objective    /74 (BP Location: Right arm, Patient Position: Sitting, Cuff Size: Adult Large)   Pulse 91   Temp 98.2  F (36.8  C) (Temporal)   Resp 16   Ht 1.549 m (5' 1\")   Wt 91.9 kg (202 lb 9.6 oz)   LMP 12/04/2023 (Exact Date)   SpO2 97%   Breastfeeding No   BMI 38.28 kg/m    Body mass index is 38.28 kg/m .  Physical Exam  Constitutional:       Appearance: She is obese. She is ill-appearing.   Eyes:      General: No scleral icterus.     Conjunctiva/sclera: Conjunctivae normal.   Cardiovascular:      Rate and Rhythm: Normal rate and regular rhythm.   Pulmonary:      Effort: Pulmonary effort is normal.      Breath sounds: Rhonchi (left lung base) and rales present.   Neurological:      Mental Status: She is alert. Mental status is at baseline.   Psychiatric:         Mood and Affect: Mood normal.         Behavior: Behavior normal.                        "

## 2024-01-02 ENCOUNTER — MYC MEDICAL ADVICE (OUTPATIENT)
Dept: INTERNAL MEDICINE | Facility: OTHER | Age: 39
End: 2024-01-02
Payer: COMMERCIAL

## 2024-01-02 ENCOUNTER — HOSPITAL ENCOUNTER (OUTPATIENT)
Dept: ULTRASOUND IMAGING | Facility: OTHER | Age: 39
Discharge: HOME | End: 2024-01-02
Payer: COMMERCIAL

## 2024-01-02 DIAGNOSIS — N92.0 MENORRHAGIA WITH REGULAR CYCLE: ICD-10-CM

## 2024-01-02 DIAGNOSIS — B37.31 CANDIDIASIS OF VAGINA: Primary | ICD-10-CM

## 2024-01-02 PROCEDURE — 76830 TRANSVAGINAL US NON-OB: CPT

## 2024-01-02 PROCEDURE — 76856 US EXAM PELVIC COMPLETE: CPT

## 2024-01-02 RX ORDER — FLUCONAZOLE 150 MG/1
150 TABLET ORAL ONCE
Qty: 1 TABLET | Refills: 0 | Status: CANCELLED | OUTPATIENT
Start: 2024-01-02 | End: 2024-01-02

## 2024-01-02 RX ORDER — FLUCONAZOLE 150 MG/1
150 TABLET ORAL
Qty: 3 TABLET | Refills: 0 | Status: SHIPPED | OUTPATIENT
Start: 2024-01-02 | End: 2024-01-09

## 2024-01-05 ENCOUNTER — MYC MEDICAL ADVICE (OUTPATIENT)
Dept: INTERNAL MEDICINE | Facility: OTHER | Age: 39
End: 2024-01-05
Payer: COMMERCIAL

## 2024-01-05 DIAGNOSIS — E66.01 CLASS 2 SEVERE OBESITY DUE TO EXCESS CALORIES WITH SERIOUS COMORBIDITY AND BODY MASS INDEX (BMI) OF 38.0 TO 38.9 IN ADULT (H): Primary | ICD-10-CM

## 2024-01-05 DIAGNOSIS — E66.812 CLASS 2 SEVERE OBESITY DUE TO EXCESS CALORIES WITH SERIOUS COMORBIDITY AND BODY MASS INDEX (BMI) OF 38.0 TO 38.9 IN ADULT (H): Primary | ICD-10-CM

## 2024-01-09 RX ORDER — PHENTERMINE HYDROCHLORIDE 15 MG/1
15 CAPSULE ORAL EVERY MORNING
Qty: 30 CAPSULE | Refills: 0 | Status: SHIPPED | OUTPATIENT
Start: 2024-01-09

## 2024-01-09 NOTE — TELEPHONE ENCOUNTER
I recommend that patient follow up in one month for further refills. Please relay typical dosing of this medication to patient:  15 to 37.5 mg once daily before breakfast or 1 to 2 hours after breakfast; in some cases, may administer 18.75 mg once or twice daily using the tablets. We will start at a low dose and titrate up as tolerated.

## 2024-01-11 ENCOUNTER — OFFICE VISIT (OUTPATIENT)
Dept: OBGYN | Facility: OTHER | Age: 39
End: 2024-01-11
Payer: COMMERCIAL

## 2024-01-11 VITALS
SYSTOLIC BLOOD PRESSURE: 110 MMHG | DIASTOLIC BLOOD PRESSURE: 70 MMHG | BODY MASS INDEX: 38.14 KG/M2 | HEIGHT: 61 IN | HEART RATE: 68 BPM | WEIGHT: 202 LBS

## 2024-01-11 DIAGNOSIS — N39.3 SUI (STRESS URINARY INCONTINENCE, FEMALE): ICD-10-CM

## 2024-01-11 DIAGNOSIS — N92.0 MENORRHAGIA WITH REGULAR CYCLE: ICD-10-CM

## 2024-01-11 DIAGNOSIS — N80.03 ADENOMYOSIS: Primary | ICD-10-CM

## 2024-01-11 PROCEDURE — 99204 OFFICE O/P NEW MOD 45 MIN: CPT | Performed by: OBSTETRICS & GYNECOLOGY

## 2024-01-11 PROCEDURE — G0463 HOSPITAL OUTPT CLINIC VISIT: HCPCS

## 2024-01-11 RX ORDER — NORGESTIMATE AND ETHINYL ESTRADIOL 0.25-0.035
1 KIT ORAL DAILY
Qty: 112 TABLET | Refills: 3 | Status: SHIPPED | OUTPATIENT
Start: 2024-01-11

## 2024-01-11 ASSESSMENT — PAIN SCALES - GENERAL: PAINLEVEL: NO PAIN (0)

## 2024-01-11 ASSESSMENT — PATIENT HEALTH QUESTIONNAIRE - PHQ9
SUM OF ALL RESPONSES TO PHQ QUESTIONS 1-9: 2
SUM OF ALL RESPONSES TO PHQ QUESTIONS 1-9: 2
10. IF YOU CHECKED OFF ANY PROBLEMS, HOW DIFFICULT HAVE THESE PROBLEMS MADE IT FOR YOU TO DO YOUR WORK, TAKE CARE OF THINGS AT HOME, OR GET ALONG WITH OTHER PEOPLE: NOT DIFFICULT AT ALL

## 2024-01-11 NOTE — PROGRESS NOTES
Gynecology Visit    CC: heavy menses    HPI:    Maria G Haley is a 38 year old , here for the above concern. She reports her periods have been getting progressively heavier over the last few years. They occur monthly but are now lasting up to 10-12 days. She has 3 days of very heavy flow, changing her pad every 2 hours, passing clots up to golfball size. She can have bad cramping on the heavy days but not always, tylenol helps. She is sexually active, sometimes has pain with intercourse but doesn't seem to correlate with timing in her menstrual cycle. Uses partner vasectomy for contraception. She also has bothersome stress incontinence. No urinary urgency.     OBHx  OB History    Para Term  AB Living   2 2 2 0 0 2   SAB IAB Ectopic Multiple Live Births   0 0 0 0 2      # Outcome Date GA Lbr Jeffy/2nd Weight Sex Delivery Anes PTL Lv   2 Term      Vag-Spont   JACQUI   1 Term      Vag-Spont   JACQUI       PMHx:   Past Medical History:   Diagnosis Date    Calculus of kidney      2008,R kidney stone ct    Developmental disorder of scholastic skills     /depression ?ADHD    Dysplastic nevus 2011    Overview:  left leg    Encounter for immunization         Gastro-esophageal reflux disease without esophagitis     No Comments Provided    History of nephrolithiasis 2008    Interstitial lung disease (H) 2017    Major depressive disorder, recurrent, mild (H24)         Nicotine dependence, uncomplicated     2011,Using nicoderm patch    Polymyositis (H) 2017      PSHx:   Past Surgical History:   Procedure Laterality Date    TONSILLECTOMY, ADENOIDECTOMY, COMBINED      age 5    TYMPANOSTOMY, LOCAL/TOPICAL ANESTHESIA      age 5,PC tubes     Meds:   Current Outpatient Medications   Medication    citalopram (CELEXA) 20 MG tablet    omeprazole (PRILOSEC) 20 MG DR capsule    phentermine (ADIPEX-P) 15 MG capsule    B Complex-Biotin-FA (SUPER B-COMPLEX) TABS    benzonatate (TESSALON)  "200 MG capsule    Calcium-Magnesium 250-125 MG TABS    cholecalciferol (VITAMIN D3) 1000 units (25 mcg) capsule    clonazePAM (KLONOPIN) 1 MG tablet    famotidine (PEPCID) 20 MG tablet    ferrous sulfate (FE TABS) 325 (65 Fe) MG EC tablet    ondansetron (ZOFRAN-ODT) 4 MG ODT tab    predniSONE (DELTASONE) 2.5 MG tablet     Current Facility-Administered Medications   Medication    ondansetron (ZOFRAN-ODT) ODT tab 4 mg      Allergies:     Allergies   Allergen Reactions    Nitrofurantoin Shortness Of Breath    Hydrocodone-Acetaminophen Nausea    Sertraline Anxiety     Increased patient's anxiety       SocHx:   Social History     Tobacco Use    Smoking status: Former     Packs/day: 1.00     Years: 15.00     Additional pack years: 0.00     Total pack years: 15.00     Types: Cigarettes     Start date: 1998     Quit date: 4/4/2013     Years since quitting: 10.7    Smokeless tobacco: Never    Tobacco comments:     Quit smoking: information provided on NRT   Vaping Use    Vaping Use: Never used   Substance Use Topics    Alcohol use: Yes     Comment: Maybe once a year    Drug use: Never       Lives with her  and 2 kids. 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 Normal hair distribution. Vagina is without lesions. smallwhite discharge. Cervix normal, no lesions, no cervical motion tenderness. Uterus is small, mobile, non-tender but somewhat high in pelvis. No adnexal tenderness or masses. +urethral hypermobility and leakage with " cough    Pelvic US:  FINDINGS:     Uterus: Anteverted position.  Uterus measures 9.7 x 6.6 x 4.9 cm.  Diffusely heterogeneous appearance of the uterine myometrium. There is  a subserosal fibroid in the anterior uterus measuring 1.6 x 1.5 x 1.4  cm.      Endometrium:  Endometrial thickness is 4 mm, within normal limits.        Right ovary: Right ovary is 2.4 x 2.4 x 2.0 cm. Right ovarian volume  is 6 cc. No adnexal masses.       Left ovary: Left ovary is 1.9 x 1.4 x 1.4 cm. Left ovarian volume is 2  cc. No adnexal masses.     Pelvic fluid: Small volume free fluid in the pelvis.                                                                      IMPRESSION:      Diffusely heterogeneous appearance of the uterine myometrium, which  can be seen in the setting of adenomyosis. MRI of the pelvis without  and with contrast could be considered for further evaluation.     Subserosal fibroid in the anterior uterus measuring up to 1.6 cm.      Assessment/Plan  Maria G Haley is a 38 year old  female here for heavy menses causing iron deficiency anemia. Reviewed ultrasound- small subserosal fibroid is unlikely to be contributing to her heavy menses. However, findings are suggestive of adenomyosis, which is more likely the cause of her heavy periods and cramping. Reviewed all management options, including OCPs, Depo, Mirena, and hysterectomy. Would not recommend ablation in the setting of adenomyosis. She is most interested in hysterectomy because she also desires surgical management of her stress incontinence but wants to wait until she would have help with her  in the summer. Plans to do OCPs until then to help with her menstrual blood loss and hopefully improve her Hgb as well. Based on exam today, would plan for an EUA, TVH vs TLH, BS, MUS, cysto. Will plan to have her follow-up in April for surgery scheduling, will review r/b at that time.       Eloisa Majano MD  OB/GYN  2024 1:14 PM

## 2024-01-11 NOTE — NURSING NOTE
Chief Complaint   Patient presents with    Consult For     Menorrhagia      Causing Low iron levels   Medication Reconciliation: complete      Ericka Hinson LPN........................1/11/2024  1:06 PM

## 2024-01-13 ENCOUNTER — MYC MEDICAL ADVICE (OUTPATIENT)
Dept: OBGYN | Facility: OTHER | Age: 39
End: 2024-01-13
Payer: COMMERCIAL

## 2024-01-25 ENCOUNTER — MYC MEDICAL ADVICE (OUTPATIENT)
Dept: INTERNAL MEDICINE | Facility: OTHER | Age: 39
End: 2024-01-25
Payer: COMMERCIAL

## 2024-01-25 DIAGNOSIS — E66.812 CLASS 2 SEVERE OBESITY DUE TO EXCESS CALORIES WITH SERIOUS COMORBIDITY AND BODY MASS INDEX (BMI) OF 38.0 TO 38.9 IN ADULT (H): Primary | ICD-10-CM

## 2024-01-25 DIAGNOSIS — E66.01 CLASS 2 SEVERE OBESITY DUE TO EXCESS CALORIES WITH SERIOUS COMORBIDITY AND BODY MASS INDEX (BMI) OF 38.0 TO 38.9 IN ADULT (H): Primary | ICD-10-CM

## 2024-01-25 RX ORDER — PHENTERMINE HYDROCHLORIDE 30 MG/1
30 CAPSULE ORAL EVERY MORNING
Qty: 30 CAPSULE | Refills: 0 | Status: CANCELLED | OUTPATIENT
Start: 2024-01-25

## 2024-01-25 NOTE — TELEPHONE ENCOUNTER
Phentermine 15mg daily started on 1/9/24.      German'd up next dose up, if appropriate and routing to prescriber for advisement.      Katya Kurtz RN on 1/25/2024 at 9:44 AM

## 2024-01-29 RX ORDER — PHENTERMINE HYDROCHLORIDE 37.5 MG/1
37.5 TABLET ORAL
Qty: 30 TABLET | Refills: 0 | Status: SHIPPED | OUTPATIENT
Start: 2024-01-29

## 2024-03-24 ENCOUNTER — MYC MEDICAL ADVICE (OUTPATIENT)
Dept: INTERNAL MEDICINE | Facility: OTHER | Age: 39
End: 2024-03-24
Payer: COMMERCIAL

## 2024-03-25 NOTE — TELEPHONE ENCOUNTER
"Skaudis out until 4/1.      My thoughts for response:  Unfortunately, we don't prescribe ant-virals \"just in-case\" for travel.  If you do contract Covid while in [place], supportive treatments would be recommended and seeking medical attention in [place] if needed, as our providers can't practice/prescribe out of [place].     Katya Kurtz RN on 3/25/2024 at 9:32 AM    "

## 2024-05-08 ENCOUNTER — MYC MEDICAL ADVICE (OUTPATIENT)
Dept: INTERNAL MEDICINE | Facility: OTHER | Age: 39
End: 2024-05-08
Payer: COMMERCIAL

## 2024-05-08 DIAGNOSIS — E66.01 CLASS 2 SEVERE OBESITY DUE TO EXCESS CALORIES WITH SERIOUS COMORBIDITY AND BODY MASS INDEX (BMI) OF 38.0 TO 38.9 IN ADULT (H): Primary | ICD-10-CM

## 2024-05-08 DIAGNOSIS — E66.812 CLASS 2 SEVERE OBESITY DUE TO EXCESS CALORIES WITH SERIOUS COMORBIDITY AND BODY MASS INDEX (BMI) OF 38.0 TO 38.9 IN ADULT (H): Primary | ICD-10-CM

## 2024-05-09 NOTE — TELEPHONE ENCOUNTER
Do you recommend any of these?    Topamax, orlistat, metformin, Wellbutrin    Tami Frederick RN on 5/9/2024 at 4:05 PM         RLQ abdominal pain

## 2024-05-09 NOTE — TELEPHONE ENCOUNTER
I am unsure whether that medication would be approved for weight loss. It is very similar to phentermine, so I would suspect it would cause the same side effects.

## 2024-08-26 ENCOUNTER — MYC MEDICAL ADVICE (OUTPATIENT)
Dept: INTERNAL MEDICINE | Facility: OTHER | Age: 39
End: 2024-08-26
Payer: COMMERCIAL

## 2024-11-18 ENCOUNTER — MYC MEDICAL ADVICE (OUTPATIENT)
Dept: INTERNAL MEDICINE | Facility: OTHER | Age: 39
End: 2024-11-18
Payer: COMMERCIAL

## 2024-11-18 DIAGNOSIS — K21.00 GASTROESOPHAGEAL REFLUX DISEASE WITH ESOPHAGITIS WITHOUT HEMORRHAGE: ICD-10-CM

## 2024-11-18 DIAGNOSIS — K44.9 LARGE HIATAL HERNIA: ICD-10-CM

## 2024-11-18 RX ORDER — OMEPRAZOLE 10 MG/1
10-20 CAPSULE, DELAYED RELEASE ORAL DAILY
Qty: 180 CAPSULE | Refills: 4 | Status: SHIPPED | OUTPATIENT
Start: 2024-11-18

## 2024-11-18 NOTE — TELEPHONE ENCOUNTER
Pt would like lower dose of Omeprazole.     Currently prescribed 20-40 mg daily.     German'd up order for 10-20 mg daily.     Routing to provider to review and respond.  Tami Frederick RN on 11/18/2024 at 9:50 AM

## 2024-11-26 DIAGNOSIS — N80.03 ADENOMYOSIS: ICD-10-CM

## 2024-11-26 RX ORDER — NORGESTIMATE AND ETHINYL ESTRADIOL 0.25-0.035
KIT ORAL
Qty: 112 TABLET | Refills: 0 | Status: SHIPPED | OUTPATIENT
Start: 2024-11-26

## 2024-11-26 NOTE — TELEPHONE ENCOUNTER
[id number] -  sent Rx request for the following:      Requested Prescriptions   Pending Prescriptions Disp Refills    SPRINTEC 28 0.25-35 MG-MCG tablet [Pharmacy Med Name: Sprintec 28 0.25-35 MG-MCG Oral Tablet] 112 tablet 0     Sig: TAKE 1 TABLET BY MOUTH ONCE DAILY. TAKE CONTINUOUSLY.       Contraceptives Protocol Failed - 11/26/2024  8:17 AM        Failed - Medication indicated for associated diagnosis     Medication is associated with one or more of the following diagnoses:  Contraception  Acne  Dysmenorrhea  Menorrhagia  Amenorrhea  PCOS  Premenstrual Dysphoric Disorder  Irregular menses  Endometriosis  Contraceptive counseling  Finding of menstrual bleeding  Education about oral contraception  Uses contraception  Initial prescription of oral contraception  Oral contraception-no problem  Oral contraceptive repeat     Last Prescription Date:   01/11/2024  Last Fill Qty/Refills:         112, R-3  Last Office Visit:              01/11/2024   Future Office visit:             Next 5 appointments (look out 90 days)      Dec 06, 2024 3:20 PM  (Arrive by 3:05 PM)  Provider Visit with Jluis Berry MD  [facility] ([facility]) [address]  [place] [address]  [phone]          Tabby Joe RN on 11/26/2024 at 8:21 AM

## 2024-12-18 ENCOUNTER — OFFICE VISIT (OUTPATIENT)
Dept: SURGERY | Facility: OTHER | Age: 39
End: 2024-12-18
Attending: SURGERY

## 2024-12-18 VITALS
TEMPERATURE: 98.1 F | BODY MASS INDEX: 38.58 KG/M2 | DIASTOLIC BLOOD PRESSURE: 68 MMHG | HEART RATE: 86 BPM | WEIGHT: 204.2 LBS | RESPIRATION RATE: 16 BRPM | SYSTOLIC BLOOD PRESSURE: 132 MMHG | OXYGEN SATURATION: 98 %

## 2024-12-18 DIAGNOSIS — D17.1 LIPOMA OF ABDOMINAL WALL: Primary | ICD-10-CM

## 2024-12-18 ASSESSMENT — PAIN SCALES - GENERAL: PAINLEVEL_OUTOF10: NO PAIN (0)

## 2024-12-18 NOTE — NURSING NOTE
"Chief Complaint   Patient presents with    Consult     Umbilical hernia possibly       Medication reconciliation completed.    FOOD SECURITY SCREENING QUESTIONS:    The next two questions are to help us understand your food security.  If you are feeling you need any assistance in this area, we have resources available to support you today.    Hunger Vital Signs:  Within the past 12 months we worried whether our food would run out before we got money to buy more. Never  Within the past 12 months the food we bought just didn't last and we didn't have money to get more. Never    Initial /68 (BP Location: Right arm, Patient Position: Sitting, Cuff Size: Adult Regular)   Pulse 86   Temp 98.1  F (36.7  C) (Temporal)   Resp 16   Wt 92.6 kg (204 lb 3.2 oz)   SpO2 98%   BMI 38.58 kg/m   Estimated body mass index is 38.58 kg/m  as calculated from the following:    Height as of 1/11/24: 1.549 m (5' 1\").    Weight as of this encounter: 92.6 kg (204 lb 3.2 oz).       Mary Navarro LPN .......  12/18/2024  1:45 PM    "

## 2024-12-18 NOTE — PROGRESS NOTES
Surgical Clinic Visit    Primary physician:     Jluis Berry    Chief complaint:   Lump on abdomen    History of present illness:  This is a 39 year old female I am seeing  for a lump on the abdomen.  She noticed a painless lump on the right mid abdomen over the past 3 to 4 months.  It has not changed size.  It is not disappear.  No prior abdominal surgery.  Has a fatty lump on her right forearm has not changed size in 2 years.    Past medical history:   Past Medical History:   Diagnosis Date    Calculus of kidney      8/13/2008,R kidney stone ct    Developmental disorder of scholastic skills     /depression ?ADHD    Dysplastic nevus 1/11/2011    Overview:  left leg    Encounter for immunization     2007    Gastro-esophageal reflux disease without esophagitis     No Comments Provided    History of nephrolithiasis 8/13/2008    Interstitial lung disease (H) 1/20/2017    Major depressive disorder, recurrent, mild (H)     2008    Nicotine dependence, uncomplicated     1/11/2011,Using nicoderm patch    Polymyositis (H) 1/24/2017       Pastsurgical history:  Past Surgical History:   Procedure Laterality Date    TONSILLECTOMY, ADENOIDECTOMY, COMBINED      age 5    TYMPANOSTOMY, LOCAL/TOPICAL ANESTHESIA      age 5,PC tubes       Current medications:  Current Outpatient Medications   Medication Sig Dispense Refill    B Complex-Biotin-FA (SUPER B-COMPLEX) TABS Take 1 tablet by mouth daily.      cholecalciferol (VITAMIN D3) 1000 units (25 mcg) capsule Take 1 capsule by mouth daily.      citalopram (CELEXA) 20 MG tablet Take 1 tablet (20 mg) by mouth daily 90 tablet 4    norgestimate-ethinyl estradiol (SPRINTEC 28) 0.25-35 MG-MCG tablet TAKE 1 TABLET BY MOUTH ONCE DAILY. TAKE CONTINUOUSLY. 112 tablet 0    omeprazole (PRILOSEC) 10 MG DR capsule Take 1-2 capsules (10-20 mg) by mouth daily. -- limit use as able -- 180 capsule 4    ondansetron (ZOFRAN-ODT) 4 MG ODT tab Take 1 tablet (4 mg) by mouth every 8 hours as needed for  nausea 30 tablet 3    predniSONE (DELTASONE) 2.5 MG tablet Take 1 tablet (2.5 mg) by mouth daily 30 tablet 1    benzonatate (TESSALON) 200 MG capsule Take 1 capsule (200 mg) by mouth 3 times daily as needed for cough (Patient not taking: Reported on 2024) 90 capsule 1    Calcium-Magnesium 250-125 MG TABS Take 1 tablet by mouth daily (Patient not taking: Reported on 2024)      clonazePAM (KLONOPIN) 1 MG tablet Take 1 tablet (1 mg) by mouth daily as needed for anxiety (Patient not taking: Reported on 2024) 90 tablet 1       Allergies:  Allergies   Allergen Reactions    Nitrofurantoin Shortness Of Breath    Hydrocodone-Acetaminophen Nausea    Sertraline Anxiety     Increased patient's anxiety       Family history:  Family History   Problem Relation Age of Onset    Colon Cancer Father         unsure but believes it was colon    Asthma Sister     Cancer Maternal Grandmother         Cancer,lung cancer    Diabetes Other         Diabetes       Social history:  Social History     Socioeconomic History    Marital status:      Spouse name: [facility]    Number of children: Not on file    Years of education: Not on file    Highest education level: Not on file   Occupational History    Occupation:  - just closed   Tobacco Use    Smoking status: Former     Current packs/day: 0.00     Average packs/day: 1 pack/day for 15.3 years (15.3 ttl pk-yrs)     Types: Cigarettes     Start date:      Quit date: 2013     Years since quittin.7    Smokeless tobacco: Never    Tobacco comments:     Quit smoking: information provided on NRT   Vaping Use    Vaping status: Never Used   Substance and Sexual Activity    Alcohol use: Yes     Comment: Maybe once a year    Drug use: Never    Sexual activity: Yes     Partners: Male     Birth control/protection: Male Surgical   Other Topics Concern    Not on file   Social History Narrative     - [facility]    2 children.     Had a , closed summer 2019 due to  medical illness/fatigue     Social Drivers of Health     Financial Resource Strain: Low Risk  (12/29/2023)    Financial Resource Strain     Within the past 12 months, have you or your family members you live with been unable to get utilities (heat, electricity) when it was really needed?: No   Food Insecurity: Low Risk  (12/29/2023)    Food Insecurity     Within the past 12 months, did you worry that your food would run out before you got money to buy more?: No     Within the past 12 months, did the food you bought just not last and you didn t have money to get more?: No   Transportation Needs: Low Risk  (12/29/2023)    Transportation Needs     Within the past 12 months, has lack of transportation kept you from medical appointments, getting your medicines, non-medical meetings or appointments, work, or from getting things that you need?: No   Physical Activity: Not on file   Stress: Not on file   Social Connections: Not on file   Interpersonal Safety: Low Risk  (12/18/2024)    Interpersonal Safety     Do you feel physically and emotionally safe where you currently live?: Yes     Within the past 12 months, have you been hit, slapped, kicked or otherwise physically hurt by someone?: No     Within the past 12 months, have you been humiliated or emotionally abused in other ways by your partner or ex-partner?: No   Housing Stability: Low Risk  (12/29/2023)    Housing Stability     Do you have housing? : Yes     Are you worried about losing your housing?: No       PROBLEM LIST:  Patient Active Problem List   Diagnosis    Heartburn    Constipation, chronic    Fibromyalgia muscle pain    Interstitial lung disease (H)    Mixed hyperlipidemia    Panic disorder    Polymyositis (H)    Recurrent major depressive disorder, in full remission (H)    Temporomandibular joint disorder    Large hiatal hernia    Mild obstructive sleep apnea - Sleep Study 1/16/2020 - [facility] - Uses CPAP nightly, finds helpful and beneficial     Gastroesophageal reflux disease with esophagitis without hemorrhage    VICKY (generalized anxiety disorder)    Chronic prescription benzodiazepine use    Class 2 severe obesity due to excess calories with serious comorbidity in adult (H)    Lipoma of abdominal wall       Review of Systems:  COMPLETE REVIEW OF SYSTEMS: Denies problems  Gastrointestinal: Denies problems  Cardiovascular: Denies problems  Respiratory: Lung disease  Genitourinary: Denies problems  Musculoskeletal: Joint and muscle pain  Skin: Denies problems  Neurologic: Headaches  Psychiatric: Depression and anxiety  Endocrine: Denies problems  Heme/Lymphatic: Denies problems  Vascular: Denies problems    Physical exam: /68 (BP Location: Right arm, Patient Position: Sitting, Cuff Size: Adult Regular)   Pulse 86   Temp 98.1  F (36.7  C) (Temporal)   Resp 16   Wt 92.6 kg (204 lb 3.2 oz)   SpO2 98%   BMI 38.58 kg/m      General: this is a pleasant female patient in no acute distress.  Patient is awake alert and oriented x3 .   EXAM:  Abdomen: No surgical scars.  Soft and nontender.  Soft subcutaneous mass 4 fingerbreadths from the umbilicus on the right diagonal above the umbilicus.  It is about 2 cm.  It does not increase in size with coughing or straining.  It does not reduce when supine.   Right forearm: 1.5 cm soft rubbery mobile subcutaneous mass    Assessment:   Palpable lipoma of the abdominal wall.  Not completely exclude a hernia without a CT scan, this would be an odd location for spontaneous ventral hernia.  Either way, it is currently without symptoms.    Plan:    Recommend observation, if it enlarges (doubles in size) or becomes symptomatic ( painful) recommend follow-up and consider CT for definitive diagnosis.       Bear Lentz MD

## 2025-01-12 ENCOUNTER — HEALTH MAINTENANCE LETTER (OUTPATIENT)
Age: 40
End: 2025-01-12

## 2025-01-18 DIAGNOSIS — F41.1 GAD (GENERALIZED ANXIETY DISORDER): ICD-10-CM

## 2025-01-18 DIAGNOSIS — N80.03 ADENOMYOSIS: ICD-10-CM

## 2025-01-20 RX ORDER — NORGESTIMATE AND ETHINYL ESTRADIOL 0.25-0.035
KIT ORAL
Qty: 28 TABLET | Refills: 0 | Status: SHIPPED | OUTPATIENT
Start: 2025-01-20

## 2025-01-20 NOTE — TELEPHONE ENCOUNTER
[id number] -  sent Rx request for the following:      Requested Prescriptions   Pending Prescriptions Disp Refills    SPRINTEC 28 0.25-35 MG-MCG tablet [Pharmacy Med Name: Sprintec 28 0.25-35 MG-MCG Oral Tablet] 112 tablet 0     Sig: TAKE 1 TABLET BY MOUTH ONCE DAILY TAKE  CONTINUOUSLY       Contraceptives Protocol Failed - 1/20/2025  8:21 AM        Failed - Recent (12 mo) or future (90 days) visit within the authorizing provider's specialty     The patient must have completed an in-person or virtual visit within the past 12 months or has a future visit scheduled within the next 90 days with the authorizing provider s specialty.  Urgent care and e-visits do not qualify as an office visit for this protocol.        Failed - Medication indicated for associated diagnosis     Medication is associated with one or more of the following diagnoses:  Contraception  Acne  Dysmenorrhea  Menorrhagia  Amenorrhea  PCOS  Premenstrual Dysphoric Disorder  Irregular menses  Endometriosis  Contraceptive counseling  Finding of menstrual bleeding  Education about oral contraception  Uses contraception  Initial prescription of oral contraception  Oral contraception-no problem  Oral contraceptive repeat     Last Prescription Date:   11/26/2024  Last Fill Qty/Refills:         112, R-0  Last Office Visit:              01/11/2024   Future Office visit:           None    Tabby Joe RN on 1/20/2025 at 8:22 AM

## 2025-01-21 NOTE — TELEPHONE ENCOUNTER
[id number] request for the following:      Requested Prescriptions   Pending Prescriptions Disp Refills    citalopram (CELEXA) 20 MG tablet [Pharmacy Med Name: Citalopram Hydrobromide 20 MG Oral Tablet] 90 tablet 0     Sig: Take 1 tablet by mouth once daily       SSRIs Protocol Failed - 1/21/2025  1:56 PM        Failed - VICKY-7 score of less than 5 in past 6 months.     Please review last VICKY-7 score.           Failed - Recent (12 mo) or future (90 days) visit within the authorizing provider's specialty     The patient must have completed an in-person or virtual visit within the past 12 months or has a future visit scheduled within the next 90 days with the authorizing provider s specialty.  Urgent care and e-visits do not qualify as an office visit for this protocol.         Last Prescription Date:   12/6/23  Last Fill Qty/Refills:         90, R-4    Last Office Visit:              12/6/23   Future Office visit:           none    Routing refill request to provider for review/approval because:  Patient needs to be seen because it has been more than 1 year since last office visit.    Karen Cotto RN on 1/21/2025 at 1:57 PM

## 2025-01-22 RX ORDER — CITALOPRAM HYDROBROMIDE 20 MG/1
20 TABLET ORAL DAILY
Qty: 90 TABLET | Refills: 0 | Status: SHIPPED | OUTPATIENT
Start: 2025-01-22

## 2025-03-31 ENCOUNTER — MYC REFILL (OUTPATIENT)
Dept: OBGYN | Facility: OTHER | Age: 40
End: 2025-03-31

## 2025-03-31 DIAGNOSIS — N80.03 ADENOMYOSIS: ICD-10-CM

## 2025-03-31 RX ORDER — NORGESTIMATE AND ETHINYL ESTRADIOL 0.25-0.035
1 KIT ORAL DAILY
Qty: 84 TABLET | Refills: 3 | Status: SHIPPED | OUTPATIENT
Start: 2025-03-31

## 2025-03-31 NOTE — TELEPHONE ENCOUNTER
Pt sent Rx request for the following:      Requested Prescriptions   Pending Prescriptions Disp Refills    norgestimate-ethinyl estradiol (SPRINTEC 28) 0.25-35 MG-MCG tablet 84 tablet 3     Sig: Take 1 tablet by mouth daily.       Contraceptives Protocol Failed - 3/31/2025 10:30 AM        Failed - Medication is active on med list and the sig matches. RN to manually verify dose and sig if red X/fail.     If the protocol passes (green check), you do not need to verify med dose and sig.    A prescription matches if they are the same clinical intention.    For Example: once daily and every morning are the same.    The protocol can not identify upper and lower case letters as matching and will fail.     For Example: Take 1 tablet (50 mg) by mouth daily     TAKE 1 TABLET (50 MG) BY MOUTH DAILY    For all fails (red x), verify dose and sig.    If the refill does match what is on file, the RN can still proceed to approve the refill request.       If they do not match, route to the appropriate provider.             Failed - Recent (12 mo) or future (90 days) visit within the authorizing provider's specialty     The patient must have completed an in-person or virtual visit within the past 12 months or has a future visit scheduled within the next 90 days with the authorizing provider s specialty.  Urgent care and e-visits do not qualify as an office visit for this protocol.          Failed - Medication indicated for associated diagnosis     Medication is associated with one or more of the following diagnoses:  Contraception  Acne  Dysmenorrhea  Menorrhagia  Amenorrhea  PCOS  Premenstrual Dysphoric Disorder  Irregular menses  Endometriosis  Contraceptive counseling  Finding of menstrual bleeding  Education about oral contraception  Uses contraception  Initial prescription of oral contraception  Oral contraception-no problem  Oral contraceptive repeat       Last Prescription Date:   01/20/2025  Last Fill Qty/Refills:         28,  R-0  Last Office Visit:              01/11/2025   Future Office visit:             Next 5 appointments (look out 90 days)      Apr 18, 2025 11:30 AM  (Arrive by 11:15 AM)  Adult Preventative Visit with [facility] ([facility]) [address]  [place] [address]  [phone]            Tabby Joe RN on 3/31/2025 at 10:31 AM     [Negative] : Integumentary

## 2025-04-28 ENCOUNTER — MYC MEDICAL ADVICE (OUTPATIENT)
Dept: INTERNAL MEDICINE | Facility: OTHER | Age: 40
End: 2025-04-28
Payer: COMMERCIAL

## 2025-04-28 NOTE — TELEPHONE ENCOUNTER
Wondering if there is anything else she can take for Acid Refulx? Has been using Famotidine but it is not working. Does not want to go back to Omeprazole.     Addressed in OV on 4/18. Started on Famotidine and Omeprazole decreased and encouraged to limit use.     Recommend OV if Famotidine is not working.     Routing to provider to review and respond.  Tami Frederick RN on 4/28/2025 at 10:41 AM

## 2025-04-29 NOTE — TELEPHONE ENCOUNTER
She could increase to 40 mg twice daily of pepcid- she can also take omeprazole as needed for breakthrough symptoms- it typically takes 6-8 weeks to  be able to fully get off of omeprazole due to rebound side effects.

## 2025-05-17 ENCOUNTER — HEALTH MAINTENANCE LETTER (OUTPATIENT)
Age: 40
End: 2025-05-17

## 2025-05-19 ASSESSMENT — PATIENT HEALTH QUESTIONNAIRE - PHQ9
SUM OF ALL RESPONSES TO PHQ QUESTIONS 1-9: 1
10. IF YOU CHECKED OFF ANY PROBLEMS, HOW DIFFICULT HAVE THESE PROBLEMS MADE IT FOR YOU TO DO YOUR WORK, TAKE CARE OF THINGS AT HOME, OR GET ALONG WITH OTHER PEOPLE: NOT DIFFICULT AT ALL
SUM OF ALL RESPONSES TO PHQ QUESTIONS 1-9: 1

## 2025-05-20 ENCOUNTER — OFFICE VISIT (OUTPATIENT)
Dept: OBGYN | Facility: OTHER | Age: 40
End: 2025-05-20
Attending: OBSTETRICS & GYNECOLOGY
Payer: COMMERCIAL

## 2025-05-20 ENCOUNTER — HOSPITAL ENCOUNTER (OUTPATIENT)
Facility: OTHER | Age: 40
End: 2025-05-20
Attending: OBSTETRICS & GYNECOLOGY | Admitting: OBSTETRICS & GYNECOLOGY
Payer: COMMERCIAL

## 2025-05-20 VITALS
DIASTOLIC BLOOD PRESSURE: 82 MMHG | BODY MASS INDEX: 38.28 KG/M2 | SYSTOLIC BLOOD PRESSURE: 124 MMHG | WEIGHT: 195 LBS | HEART RATE: 72 BPM | HEIGHT: 60 IN

## 2025-05-20 DIAGNOSIS — N93.9 ABNORMAL UTERINE BLEEDING (AUB): Primary | ICD-10-CM

## 2025-05-20 DIAGNOSIS — Z12.31 ENCOUNTER FOR SCREENING MAMMOGRAM FOR BREAST CANCER: ICD-10-CM

## 2025-05-20 RX ORDER — CEFAZOLIN SODIUM 2 G/100ML
2 INJECTION, SOLUTION INTRAVENOUS
OUTPATIENT
Start: 2025-05-20

## 2025-05-20 RX ORDER — CEFAZOLIN SODIUM 2 G/100ML
2 INJECTION, SOLUTION INTRAVENOUS SEE ADMIN INSTRUCTIONS
OUTPATIENT
Start: 2025-05-20

## 2025-05-20 RX ORDER — METRONIDAZOLE 500 MG/100ML
500 INJECTION, SOLUTION INTRAVENOUS
OUTPATIENT
Start: 2025-05-20

## 2025-05-20 RX ORDER — PHENAZOPYRIDINE HYDROCHLORIDE 100 MG/1
200 TABLET, FILM COATED ORAL ONCE
OUTPATIENT
Start: 2025-05-20 | End: 2025-05-20

## 2025-05-20 RX ORDER — ACETAMINOPHEN 325 MG/1
975 TABLET ORAL ONCE
OUTPATIENT
Start: 2025-05-20 | End: 2025-05-20

## 2025-05-20 ASSESSMENT — PAIN SCALES - GENERAL: PAINLEVEL_OUTOF10: NO PAIN (0)

## 2025-05-20 NOTE — NURSING NOTE
Chief Complaint   Patient presents with    Follow Up     Discuss Surgery - Anemia        Medication Reconciliation: complete    Ericka Hinson LPN........................5/20/2025  1:40 PM

## 2025-05-20 NOTE — PROGRESS NOTES
"Follow-Up Visit    S: Ms. Maria G Haley is a 40 year old  here for AUB-adenomyosis. She has been taking OCPs continuously and still getting daily spotting, no heavy bleeding. No cramping. She was seen by her PCP and found to still be anemic, thought secondary to the persistent spotting. She is now interested in discussing hysterectomy. Also has questions about stress incontinence and what can be done.     O:  /82 (BP Location: Right arm, Patient Position: Sitting, Cuff Size: Adult Large)   Pulse 72   Ht 1.53 m (5' 0.25\")   Wt 88.5 kg (195 lb)   BMI 37.77 kg/m    Gen: Well-appearing, NAD  Pulm: nonlabored  Psych: appropriate mood and affect    Pelvic:  Normal appearing external female genitalia. Normal hair distribution. Vagina is without lesions. Moderate bloody discharge. Cervix normal, no lesions, no cervical motion tenderness. Uterus is 10 week size, mobile, non-tender. No adnexal tenderness or masses. Tight pelvic floor muscles but adequate pelvic space.     A/P:  Ms. Maria G Haley is a 40 year old  here for AUB-Adenomyosis follow up. She has persistent anemia likely secondary to daily spotting despite medical therapy. Will have her try stopping OCPs x1 week and resuming to see if this can help resolve her spotting. For hysterectomy, she would be a candidate for a vNOTES hysterectomy, BS, cystoscopy. Also discussed addition of midurethral sling for stress incontinence and she wants to think more about it. Discussed r/b of the planned procedure, including risk of bleeding, infection, injury to surrounding organs, risk of mesh complications and need for additional surgery. She agrees to proceed, tentatively scheduled for 9/10/25. Will need a preop within 30 days of surgery    Eloisa Majano MD  OB/GYN  2025 2:00 PM    "

## 2025-05-20 NOTE — PROGRESS NOTES
"Date of Surgery: September 10, 2025  Type of Surgery: Ibrahima, BS, possible mid urethral sling, cystoscopy  Surgeon: Dr. Krystal Majano    Patient was given surgical folder  which includes pre-operative bathing instructions related to the two packets of Hibiclens surgical prep provided. appropriate appointments were scheduled by the [facility] .. Surgical forms were copied and kept for informative purposes. Originals were delivered to Day-surgery. Questions were answered to the best of this nurse's ability.        STOP BANG    Fever/Chills or other infectious symptoms in past month? denies  >10 pound weight loss in the past 2 months? denies  Health Care Directive on file? denies  History of blood transfusions? denies  Td up to date? YES 4/18/2025  History of VRE/MRSA? denies      Obstructive Sleep Apnea screening    Preoperative Evaluation: Obstructive Sleep Apnea screening    S: Snore -  Do you snore loudly? (louder than talking or loud enough to be heard through closed doors) No  T: Tired - Do you often feel tired, fatigued, or sleepy during the daytime?No  O: Observed - Has anyone ever observed you stop breathing during your sleep?No  P: Pressure - Do you have or are you being treated for high blood pressure?No  B: BMI - BMI greater than 35kg/m2?Yes  A: Age - Age over 50 years old?No  N: Neck - Neck circumference greater than 40 cm?No  G: Gender - Gender: Male?No    Total number of \"YES\" responses:  1    HAS CPAP, WILL BRING TO SURGERY    Scoring: Low risk of BARBY 0-2  At Risk of BARBY: >3 High Risk of BARBY: 5-8      Total yes answers in BARBY section:    Low risk 0-2  At risk 3-4  High risk 5-8    Hiwot Arana RN............. 5/20/2025 2:19 PM   "

## 2025-07-11 ENCOUNTER — MYC REFILL (OUTPATIENT)
Dept: INTERNAL MEDICINE | Facility: OTHER | Age: 40
End: 2025-07-11
Payer: COMMERCIAL

## 2025-07-11 DIAGNOSIS — E78.2 MIXED HYPERLIPIDEMIA: ICD-10-CM

## 2025-07-17 RX ORDER — ATORVASTATIN CALCIUM 10 MG/1
10 TABLET, FILM COATED ORAL DAILY
Qty: 30 TABLET | Refills: 0 | Status: SHIPPED | OUTPATIENT
Start: 2025-07-17

## 2025-07-17 NOTE — TELEPHONE ENCOUNTER
Patient sent a [employer] message requesting:      Requested Prescriptions   Pending Prescriptions Disp Refills    atorvastatin (LIPITOR) 10 MG tablet 90 tablet 0     Sig: Take 1 tablet (10 mg) by mouth daily.       Antihyperlipidemic agents Passed - 7/17/2025 10:21 AM        Last Prescription Date:   4/18/25  Last Fill Qty/Refills:         90, R-0    Last Office Visit:              4/18/25   Future Office visit:           8/25/25    Prescription approved per [facility] Refill Protocol.    SENIA SHI RN on 7/17/2025 at 10:24 AM

## 2025-08-25 ENCOUNTER — HOSPITAL ENCOUNTER (OUTPATIENT)
Dept: BONE DENSITY | Facility: OTHER | Age: 40
Discharge: HOME | End: 2025-08-25
Admitting: STUDENT IN AN ORGANIZED HEALTH CARE EDUCATION/TRAINING PROGRAM
Payer: COMMERCIAL

## 2025-08-25 DIAGNOSIS — Z92.241 HISTORY OF STEROID THERAPY: ICD-10-CM

## 2025-08-25 PROCEDURE — 77080 DXA BONE DENSITY AXIAL: CPT | Mod: 26 | Performed by: STUDENT IN AN ORGANIZED HEALTH CARE EDUCATION/TRAINING PROGRAM

## 2025-08-25 PROCEDURE — 77080 DXA BONE DENSITY AXIAL: CPT

## 2025-08-27 ENCOUNTER — MYC REFILL (OUTPATIENT)
Dept: INTERNAL MEDICINE | Facility: OTHER | Age: 40
End: 2025-08-27
Payer: COMMERCIAL

## 2025-08-27 DIAGNOSIS — E78.2 MIXED HYPERLIPIDEMIA: ICD-10-CM

## 2025-09-02 ENCOUNTER — MYC MEDICAL ADVICE (OUTPATIENT)
Dept: OBGYN | Facility: OTHER | Age: 40
End: 2025-09-02
Payer: COMMERCIAL

## 2025-09-02 RX ORDER — ATORVASTATIN CALCIUM 10 MG/1
10 TABLET, FILM COATED ORAL DAILY
Qty: 30 TABLET | Refills: 0 | OUTPATIENT
Start: 2025-09-02

## (undated) RX ORDER — ONDANSETRON 4 MG/1
TABLET, ORALLY DISINTEGRATING ORAL
Status: DISPENSED
Start: 2021-12-06